# Patient Record
Sex: FEMALE | Race: WHITE | NOT HISPANIC OR LATINO
[De-identification: names, ages, dates, MRNs, and addresses within clinical notes are randomized per-mention and may not be internally consistent; named-entity substitution may affect disease eponyms.]

---

## 2018-11-08 PROBLEM — Z00.00 ENCOUNTER FOR PREVENTIVE HEALTH EXAMINATION: Status: ACTIVE | Noted: 2018-11-08

## 2018-11-16 ENCOUNTER — RECORD ABSTRACTING (OUTPATIENT)
Age: 83
End: 2018-11-16

## 2018-11-16 DIAGNOSIS — R55 SYNCOPE AND COLLAPSE: ICD-10-CM

## 2018-11-16 DIAGNOSIS — Z82.49 FAMILY HISTORY OF ISCHEMIC HEART DISEASE AND OTHER DISEASES OF THE CIRCULATORY SYSTEM: ICD-10-CM

## 2018-11-16 DIAGNOSIS — E53.8 DEFICIENCY OF OTHER SPECIFIED B GROUP VITAMINS: ICD-10-CM

## 2018-11-16 DIAGNOSIS — Z82.3 FAMILY HISTORY OF STROKE: ICD-10-CM

## 2018-11-16 DIAGNOSIS — Z86.19 PERSONAL HISTORY OF OTHER INFECTIOUS AND PARASITIC DISEASES: ICD-10-CM

## 2018-12-12 ENCOUNTER — APPOINTMENT (OUTPATIENT)
Dept: GERIATRICS | Facility: CLINIC | Age: 83
End: 2018-12-12

## 2018-12-14 ENCOUNTER — APPOINTMENT (OUTPATIENT)
Dept: CARDIOLOGY | Facility: CLINIC | Age: 83
End: 2018-12-14
Payer: MEDICARE

## 2018-12-14 VITALS
HEART RATE: 62 BPM | DIASTOLIC BLOOD PRESSURE: 70 MMHG | HEIGHT: 72 IN | BODY MASS INDEX: 21.67 KG/M2 | WEIGHT: 160 LBS | SYSTOLIC BLOOD PRESSURE: 110 MMHG

## 2018-12-14 DIAGNOSIS — Z87.891 PERSONAL HISTORY OF NICOTINE DEPENDENCE: ICD-10-CM

## 2018-12-14 PROCEDURE — 99213 OFFICE O/P EST LOW 20 MIN: CPT

## 2018-12-14 NOTE — PHYSICAL EXAM
[General Appearance - Well Developed] : well developed [Normal Appearance] : normal appearance [Well Groomed] : well groomed [General Appearance - Well Nourished] : well nourished [No Deformities] : no deformities [General Appearance - In No Acute Distress] : no acute distress [Normal Conjunctiva] : the conjunctiva exhibited no abnormalities [Eyelids - No Xanthelasma] : the eyelids demonstrated no xanthelasmas [Normal Oral Mucosa] : normal oral mucosa [No Oral Pallor] : no oral pallor [No Oral Cyanosis] : no oral cyanosis [Normal Jugular Venous A Waves Present] : normal jugular venous A waves present [Normal Jugular Venous V Waves Present] : normal jugular venous V waves present [No Jugular Venous Ferrara A Waves] : no jugular venous ferrara A waves [Respiration, Rhythm And Depth] : normal respiratory rhythm and effort [Exaggerated Use Of Accessory Muscles For Inspiration] : no accessory muscle use [Auscultation Breath Sounds / Voice Sounds] : lungs were clear to auscultation bilaterally [Heart Rate And Rhythm] : heart rate and rhythm were normal [Heart Sounds] : normal S1 and S2 [Murmurs] : no murmurs present [Abdomen Soft] : soft [Abdomen Tenderness] : non-tender [Abdomen Mass (___ Cm)] : no abdominal mass palpated [Abnormal Walk] : normal gait [Gait - Sufficient For Exercise Testing] : the gait was sufficient for exercise testing [Nail Clubbing] : no clubbing of the fingernails [Cyanosis, Localized] : no localized cyanosis [Petechial Hemorrhages (___cm)] : no petechial hemorrhages [Skin Color & Pigmentation] : normal skin color and pigmentation [] : no rash [No Venous Stasis] : no venous stasis [Skin Lesions] : no skin lesions [No Skin Ulcers] : no skin ulcer [No Xanthoma] : no  xanthoma was observed [Oriented To Time, Place, And Person] : oriented to person, place, and time [Affect] : the affect was normal [Mood] : the mood was normal [No Anxiety] : not feeling anxious

## 2018-12-14 NOTE — HISTORY OF PRESENT ILLNESS
[FreeTextEntry1] : Patient presents with increased frequency and intensity of palpitations over the past year. Pt reports the sensation of their heart skipping a beat. Pt denies alleviating or aggravating factors. Pt reports drinking 2 cups of caffeine. Palpitations are worse when lying down and at rest. Pt denies symptoms of chest pain, syncope, claudication, orthopnea, PND.\par               Other significant history include having 2 TIAs. \par        Pt saw Dr. Schneider and medical therapy is with toprol was started - pt states she feels no further chest pain. a stress echo conducted revealed no obvious ischemia. No cardiac cath or EP at this time. \par        Since last visit -blood pressure is well controlled.

## 2018-12-14 NOTE — ASSESSMENT
[FreeTextEntry1] : EKG - 12/4 - NSR, LAE, low voltage\par \par 1. Palpitations - improved on toprol\par 2. Essential (primary) hypertension - I10  controlled\par 3. NSVT (nonsustained ventricular tachycardia) - I47.2  - stress test in Nov 2017 - no ischemia. \par 4. Claudication - I73.9   improved\par  continue metoprolol, hydrochlorothiazide, amlodipine. \par Holter monitor in November 2017 reveals 13 beats of V. tach patient remains on Toprol no current issues\par Blood pressure is well controlled. 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 May repeat echocardiogram \par \par of note pt had a peripheral vascular work up for cynaotic toes but shows normal resting ABIs in oct 2017 \par However has abnormal exercise right lower extremity VELASQUEZ consistent with inducible ischemia. \par Follow-up with Dr. Sanchez. \par Pt does not know her meds - therefore unable to reconcile

## 2018-12-18 ENCOUNTER — APPOINTMENT (OUTPATIENT)
Dept: GERIATRICS | Facility: CLINIC | Age: 83
End: 2018-12-18
Payer: MEDICARE

## 2018-12-18 VITALS
RESPIRATION RATE: 20 BRPM | DIASTOLIC BLOOD PRESSURE: 68 MMHG | OXYGEN SATURATION: 98 % | HEART RATE: 76 BPM | TEMPERATURE: 96.9 F | SYSTOLIC BLOOD PRESSURE: 118 MMHG

## 2018-12-18 PROCEDURE — 99213 OFFICE O/P EST LOW 20 MIN: CPT

## 2018-12-31 ENCOUNTER — RX RENEWAL (OUTPATIENT)
Age: 83
End: 2018-12-31

## 2019-01-02 ENCOUNTER — APPOINTMENT (OUTPATIENT)
Dept: GERIATRICS | Facility: CLINIC | Age: 84
End: 2019-01-02
Payer: MEDICARE

## 2019-01-02 VITALS — HEART RATE: 70 BPM | DIASTOLIC BLOOD PRESSURE: 70 MMHG | SYSTOLIC BLOOD PRESSURE: 120 MMHG | RESPIRATION RATE: 16 BRPM

## 2019-01-02 PROCEDURE — 99214 OFFICE O/P EST MOD 30 MIN: CPT

## 2019-01-16 ENCOUNTER — APPOINTMENT (OUTPATIENT)
Dept: GERIATRICS | Facility: CLINIC | Age: 84
End: 2019-01-16
Payer: MEDICARE

## 2019-01-16 DIAGNOSIS — Z60.2 PROBLEMS RELATED TO LIVING ALONE: ICD-10-CM

## 2019-01-16 PROCEDURE — 99214 OFFICE O/P EST MOD 30 MIN: CPT

## 2019-01-16 SDOH — SOCIAL STABILITY - SOCIAL INSECURITY: PROBLEMS RELATED TO LIVING ALONE: Z60.2

## 2019-01-30 ENCOUNTER — APPOINTMENT (OUTPATIENT)
Dept: GERIATRICS | Facility: CLINIC | Age: 84
End: 2019-01-30
Payer: MEDICARE

## 2019-01-30 VITALS
DIASTOLIC BLOOD PRESSURE: 78 MMHG | RESPIRATION RATE: 16 BRPM | HEART RATE: 68 BPM | SYSTOLIC BLOOD PRESSURE: 130 MMHG | OXYGEN SATURATION: 98 %

## 2019-01-30 VITALS — SYSTOLIC BLOOD PRESSURE: 120 MMHG | RESPIRATION RATE: 16 BRPM | HEART RATE: 70 BPM | DIASTOLIC BLOOD PRESSURE: 80 MMHG

## 2019-01-30 PROBLEM — Z60.2 LIVING ALONE: Status: ACTIVE | Noted: 2019-01-30

## 2019-01-30 PROCEDURE — 99213 OFFICE O/P EST LOW 20 MIN: CPT

## 2019-01-30 RX ORDER — OXYBUTYNIN CHLORIDE 5 MG/1
5 TABLET, EXTENDED RELEASE ORAL
Qty: 30 | Refills: 0 | Status: DISCONTINUED | COMMUNITY
Start: 2018-10-18

## 2019-01-30 RX ORDER — CEPHALEXIN 500 MG/1
500 CAPSULE ORAL
Qty: 14 | Refills: 0 | Status: DISCONTINUED | COMMUNITY
Start: 2018-08-02

## 2019-01-30 RX ORDER — NITROFURANTOIN (MONOHYDRATE/MACROCRYSTALS) 25; 75 MG/1; MG/1
100 CAPSULE ORAL
Qty: 14 | Refills: 0 | Status: DISCONTINUED | COMMUNITY
Start: 2018-11-21

## 2019-01-30 RX ORDER — SULFAMETHOXAZOLE AND TRIMETHOPRIM 800; 160 MG/1; MG/1
800-160 TABLET ORAL
Qty: 6 | Refills: 0 | Status: DISCONTINUED | COMMUNITY
Start: 2018-07-30

## 2019-01-30 RX ORDER — MELOXICAM 7.5 MG/1
7.5 TABLET ORAL
Qty: 60 | Refills: 0 | Status: DISCONTINUED | COMMUNITY
Start: 2019-01-09 | End: 1900-01-01

## 2019-01-30 NOTE — HISTORY OF PRESENT ILLNESS
[1] : 2) Feeling down, depressed, or hopeless for several days [PHQ-2 Score ___] : PHQ-2 Score [unfilled] [FreeTextEntry1] : now on medication management\par no issues but still resistant to loss of independence\par there have been too many medication errors in past year\par to discuss antidepressant per daughters request\par

## 2019-01-30 NOTE — PHYSICAL EXAM
[General Appearance - Alert] : alert [General Appearance - In No Acute Distress] : in no acute distress [Sclera] : the sclera and conjunctiva were normal [PERRL With Normal Accommodation] : pupils were equal in size, round, and reactive to light [Extraocular Movements] : extraocular movements were intact [] : no respiratory distress [Auscultation Breath Sounds / Voice Sounds] : lungs were clear to auscultation bilaterally [Heart Rate And Rhythm] : heart rate was normal and rhythm regular [Heart Sounds] : normal S1 and S2 [Heart Sounds Gallop] : no gallops [Murmurs] : no murmurs [Heart Sounds Pericardial Friction Rub] : no pericardial rub [Motor Tone] : muscle strength and tone were normal [FreeTextEntry1] : No ecchymosis, no redness, no open skin of R knee or elsewhere

## 2019-01-30 NOTE — ASSESSMENT
[FreeTextEntry1] : No evidence of fracture, pt is walking well. Can use ice for next 24 hours then switch to warm heat for pain. Tylenol bid today and tomorrow and then prn. Pt verbalized understanding

## 2019-01-30 NOTE — HISTORY OF PRESENT ILLNESS
[FreeTextEntry1] : Pt had a trip and fall on 1/28 in the evening, did not lose conciousness, no dizziness, no chest pain

## 2019-01-30 NOTE — SOCIAL HISTORY
[One fall no injury in past year] : Patient reported one fall in the past year without injury [Fully functional (bathing, dressing, toileting, transferring, walking, feeding)] : Fully functional (bathing, dressing, toileting, transferring, walking, feeding)

## 2019-01-30 NOTE — REVIEW OF SYSTEMS
[Feeling Poorly] : feeling poorly [Feeling Tired] : feeling tired [Incontinence] : incontinence [Depression] : depression [Fever] : no fever [Chills] : no chills [Eye Pain] : no eye pain [Red Eyes] : eyes not red [Sore Throat] : no sore throat [Hoarseness] : no hoarseness [Chest Pain] : no chest pain [Palpitations] : no palpitations [Wheezing] : no wheezing [Cough] : no cough [SOB on Exertion] : no shortness of breath during exertion [Abdominal Pain] : no abdominal pain [Vomiting] : no vomiting [Vaginal Discharge] : no vaginal discharge [Abn Vaginal Bleeding] : no unexplained vaginal bleeding [Arthralgias] : no arthralgias [Joint Pain] : no joint pain [Skin Lesions] : no skin lesions [Skin Wound] : no skin wound

## 2019-01-30 NOTE — ASSESSMENT
[FreeTextEntry1] : discussed with patient that she truly thinks that if OAB could be improved her quality of life would drastically change\par will trial myrbetriq\par if no improvement in 2-4 weeks will dc entirely and reconsider antidepressant\par alerted medication managemnt nurse of changes

## 2019-01-30 NOTE — PHYSICAL EXAM
[General Appearance - Alert] : alert [General Appearance - In No Acute Distress] : in no acute distress [General Appearance - Well Nourished] : well nourished [General Appearance - Well Developed] : well developed [Sclera] : the sclera and conjunctiva were normal [PERRL With Normal Accommodation] : pupils were equal in size, round, and reactive to light [Extraocular Movements] : extraocular movements were intact [Normal Oral Mucosa] : normal oral mucosa [No Oral Pallor] : no oral pallor [Neck Appearance] : the appearance of the neck was normal [Respiration, Rhythm And Depth] : normal respiratory rhythm and effort [Exaggerated Use Of Accessory Muscles For Inspiration] : no accessory muscle use [Auscultation Breath Sounds / Voice Sounds] : lungs were clear to auscultation bilaterally [Heart Rate And Rhythm] : heart rate was normal and rhythm regular [Heart Sounds Gallop] : no gallops [Heart Sounds Pericardial Friction Rub] : no pericardial rub [Bowel Sounds] : normal bowel sounds [Abdomen Soft] : soft [Abdomen Tenderness] : non-tender [Skin Color & Pigmentation] : normal skin color and pigmentation [] : no rash [Affect] : the affect was normal [Mood] : the mood was normal [FreeTextEntry1] : memory loss more apparent and worse during UTI

## 2019-02-06 ENCOUNTER — APPOINTMENT (OUTPATIENT)
Dept: GERIATRICS | Facility: CLINIC | Age: 84
End: 2019-02-06
Payer: MEDICARE

## 2019-02-06 VITALS
TEMPERATURE: 98.7 F | OXYGEN SATURATION: 98 % | HEIGHT: 72 IN | HEART RATE: 99 BPM | BODY MASS INDEX: 21.54 KG/M2 | WEIGHT: 159 LBS | DIASTOLIC BLOOD PRESSURE: 80 MMHG | SYSTOLIC BLOOD PRESSURE: 122 MMHG

## 2019-02-06 DIAGNOSIS — Z60.2 PROBLEMS RELATED TO LIVING ALONE: ICD-10-CM

## 2019-02-06 PROCEDURE — 99214 OFFICE O/P EST MOD 30 MIN: CPT

## 2019-02-06 RX ORDER — LEVOFLOXACIN 500 MG/1
500 TABLET, FILM COATED ORAL DAILY
Qty: 7 | Refills: 0 | Status: DISCONTINUED | COMMUNITY
Start: 2018-12-21 | End: 2019-02-06

## 2019-02-06 SDOH — SOCIAL STABILITY - SOCIAL INSECURITY: PROBLEMS RELATED TO LIVING ALONE: Z60.2

## 2019-02-06 NOTE — HISTORY OF PRESENT ILLNESS
[0] : 1) Little interest or pleasure doing things: Not at all [1] : 2) Feeling down, depressed, or hopeless for several days [PHQ-2 Score ___] : PHQ-2 Score [unfilled] [FreeTextEntry1] : presenting with daughter \par trialed myrbetriq nighttime urination imrpoved from 3 to 2x per night\par no substantial change per patient\par

## 2019-02-06 NOTE — ASSESSMENT
[FreeTextEntry1] : will trial higher dose myrbetriq 50mg daily x 7 days\par if no improvement in quality of life would stop entirely and consider fluoxetine low dose\par all in agreement\par advise 3 glasses of water daily\par likely leading to dizziness that predisposes to dehydrationa nd UTI

## 2019-02-06 NOTE — REVIEW OF SYSTEMS
[Incontinence] : incontinence [Arthralgias] : arthralgias [Joint Pain] : joint pain [Depression] : depression [Feeling Poorly] : not feeling poorly [Feeling Tired] : not feeling tired [Eye Pain] : no eye pain [Red Eyes] : eyes not red [Nosebleeds] : no nosebleeds [Nasal Discharge] : no nasal discharge [Chest Pain] : no chest pain [Palpitations] : no palpitations [Shortness Of Breath] : no shortness of breath [Wheezing] : no wheezing [Abdominal Pain] : no abdominal pain [Vomiting] : no vomiting [Vaginal Discharge] : no vaginal discharge [Abn Vaginal Bleeding] : no unexplained vaginal bleeding [Skin Lesions] : no skin lesions [Skin Wound] : no skin wound

## 2019-02-06 NOTE — SOCIAL HISTORY
[Any fall with injury in past year] : Patient reported fall with injury in the past year [Fully functional (bathing, dressing, toileting, transferring, walking, feeding)] : Fully functional (bathing, dressing, toileting, transferring, walking, feeding) [Fully functional (using the telephone, shopping, preparing meals, housekeeping, doing laundry, using transportation,] : Fully functional and needs no help or supervision to perform IADLs (using the telephone, shopping, preparing meals, housekeeping, doing laundry, using transportation, managing medications and managing finances) [Canes] : chuy

## 2019-02-15 ENCOUNTER — APPOINTMENT (OUTPATIENT)
Dept: GERIATRICS | Facility: CLINIC | Age: 84
End: 2019-02-15
Payer: MEDICARE

## 2019-02-15 VITALS — HEART RATE: 91 BPM | SYSTOLIC BLOOD PRESSURE: 104 MMHG | DIASTOLIC BLOOD PRESSURE: 60 MMHG | OXYGEN SATURATION: 94 %

## 2019-02-15 PROCEDURE — 99214 OFFICE O/P EST MOD 30 MIN: CPT

## 2019-02-15 NOTE — ASSESSMENT
[FreeTextEntry1] : Pt recently was started and increased on Myrbetriq and may be getting dehydrated\par She agreed to increase fluids. Also Rest, supportive care.\par Son José will be with pt over weekend seek emergent care if any chest pain, SOB or pain\par d/w Dr. FUCHS in agreement. Pt will f/u with Dr. FUCHS on 4/20

## 2019-02-15 NOTE — HISTORY OF PRESENT ILLNESS
[FreeTextEntry1] : Has had a cough for about a week, worse at night\par feeling tired, no pain, no SOB, no chills, no fever, no body ache\par no dysuria

## 2019-02-15 NOTE — PHYSICAL EXAM
[Moist] : moist mucosa [Exudate] : exudate [] : no respiratory distress [Respiration, Rhythm And Depth] : normal respiratory rhythm and effort [FreeTextEntry1] : occassional scattered wheeze [Apical Impulse] : the apical impulse was normal [Heart Rate And Rhythm] : heart rate was normal and rhythm regular

## 2019-02-20 ENCOUNTER — APPOINTMENT (OUTPATIENT)
Dept: GERIATRICS | Facility: ASSISTED LIVING FACILITY | Age: 84
End: 2019-02-20
Payer: MEDICARE

## 2019-02-20 PROCEDURE — 99214 OFFICE O/P EST MOD 30 MIN: CPT

## 2019-02-22 VITALS — DIASTOLIC BLOOD PRESSURE: 70 MMHG | RESPIRATION RATE: 16 BRPM | HEART RATE: 86 BPM | SYSTOLIC BLOOD PRESSURE: 110 MMHG

## 2019-02-22 NOTE — ASSESSMENT
[FreeTextEntry1] : to complete antibiotoics for URI\par will hold HCTZ 25mg and amitryptaline x 4 weeks\par if worsening LE edema/HTN or bladder/depression will resume\par next would be to stop vitamin B12 and possibly oxybutynin\par continue mobic\par rewrote PT Rx as requested by PT department - PRAVIN BL knees\par continue myrbetriq at 50mg could consider dc oxybutynin in future\par try to avoid polypharmacy\par RTC 2 weeks to have BP checked and reevalute meds\par also to discuss antidepressant afte rthese changes are all done

## 2019-02-22 NOTE — HISTORY OF PRESENT ILLNESS
[1] : 2) Feeling down, depressed, or hopeless for several days [PHQ-2 Score ___] : PHQ-2 Score [unfilled] [FreeTextEntry1] : remains on myrbetriq\par recently on antibiotics by NP for suspected bacterial URI\par feels she is on too many medications\par

## 2019-02-22 NOTE — REVIEW OF SYSTEMS
[Feeling Poorly] : feeling poorly [Feeling Tired] : feeling tired [Cough] : cough [Incontinence] : incontinence [Arthralgias] : arthralgias [Joint Pain] : joint pain [Fever] : no fever [Chills] : no chills [Eye Pain] : no eye pain [Red Eyes] : eyes not red [Nosebleeds] : no nosebleeds [Nasal Discharge] : no nasal discharge [Sore Throat] : no sore throat [Hoarseness] : no hoarseness [Heart Rate Is Slow] : the heart rate was not slow [Heart Rate Is Fast] : the heart rate was not fast [Chest Pain] : no chest pain [Palpitations] : no palpitations [Shortness Of Breath] : no shortness of breath [Wheezing] : no wheezing [Abdominal Pain] : no abdominal pain [Vomiting] : no vomiting [Vaginal Discharge] : no vaginal discharge [Abn Vaginal Bleeding] : no unexplained vaginal bleeding [Skin Lesions] : no skin lesions [Skin Wound] : no skin wound [Dizziness] : no dizziness [Fainting] : no fainting [Change In Personality] : no personality change [Emotional Problems] : no emotional problems

## 2019-03-06 ENCOUNTER — APPOINTMENT (OUTPATIENT)
Dept: GERIATRICS | Facility: CLINIC | Age: 84
End: 2019-03-06

## 2019-03-19 ENCOUNTER — APPOINTMENT (OUTPATIENT)
Dept: CARDIOLOGY | Facility: CLINIC | Age: 84
End: 2019-03-19
Payer: MEDICARE

## 2019-03-19 VITALS
WEIGHT: 155 LBS | DIASTOLIC BLOOD PRESSURE: 80 MMHG | HEIGHT: 70 IN | BODY MASS INDEX: 22.19 KG/M2 | SYSTOLIC BLOOD PRESSURE: 130 MMHG | HEART RATE: 84 BPM

## 2019-03-19 DIAGNOSIS — I35.9 NONRHEUMATIC AORTIC VALVE DISORDER, UNSPECIFIED: ICD-10-CM

## 2019-03-19 PROCEDURE — 99214 OFFICE O/P EST MOD 30 MIN: CPT

## 2019-03-19 NOTE — HISTORY OF PRESENT ILLNESS
[FreeTextEntry1] : Patient originally presented with increased frequency and intensity of palpitations over the past year described as a sensation of her heart skipping a beat. Pt denies alleviating or aggravating factors. She reports drinking 2 cups of caffeine. Palpitations are worse when lying down and at rest. Pt denies symptoms of chest pain, syncope, claudication, orthopnea, PND.\par               Other significant history include having 2 TIAs. \par        Pt saw Dr. Schneider and medical therapy with toprol was started - pt states she feels no further chest pain. a stress echo conducted revealed no obvious ischemia. \par        Since last visit -blood pressure is well controlled. Pt does not recall her medications but states they were reconciled with her PCP.

## 2019-03-19 NOTE — PHYSICAL EXAM
[General Appearance - Well Developed] : well developed [Normal Appearance] : normal appearance [Well Groomed] : well groomed [General Appearance - Well Nourished] : well nourished [No Deformities] : no deformities [General Appearance - In No Acute Distress] : no acute distress [Normal Conjunctiva] : the conjunctiva exhibited no abnormalities [Eyelids - No Xanthelasma] : the eyelids demonstrated no xanthelasmas [Normal Oral Mucosa] : normal oral mucosa [No Oral Pallor] : no oral pallor [No Oral Cyanosis] : no oral cyanosis [Normal Jugular Venous A Waves Present] : normal jugular venous A waves present [Normal Jugular Venous V Waves Present] : normal jugular venous V waves present [No Jugular Venous Ferrara A Waves] : no jugular venous ferrara A waves [Respiration, Rhythm And Depth] : normal respiratory rhythm and effort [Exaggerated Use Of Accessory Muscles For Inspiration] : no accessory muscle use [Auscultation Breath Sounds / Voice Sounds] : lungs were clear to auscultation bilaterally [Heart Rate And Rhythm] : heart rate and rhythm were normal [Heart Sounds] : normal S1 and S2 [Murmurs] : no murmurs present [Abdomen Tenderness] : non-tender [Abdomen Soft] : soft [Abdomen Mass (___ Cm)] : no abdominal mass palpated [Abnormal Walk] : normal gait [Gait - Sufficient For Exercise Testing] : the gait was sufficient for exercise testing [Nail Clubbing] : no clubbing of the fingernails [Cyanosis, Localized] : no localized cyanosis [Petechial Hemorrhages (___cm)] : no petechial hemorrhages [] : no rash [Skin Color & Pigmentation] : normal skin color and pigmentation [No Venous Stasis] : no venous stasis [Skin Lesions] : no skin lesions [No Xanthoma] : no  xanthoma was observed [No Skin Ulcers] : no skin ulcer [Oriented To Time, Place, And Person] : oriented to person, place, and time [Affect] : the affect was normal [No Anxiety] : not feeling anxious [Mood] : the mood was normal

## 2019-03-20 ENCOUNTER — APPOINTMENT (OUTPATIENT)
Dept: GERIATRICS | Facility: CLINIC | Age: 84
End: 2019-03-20

## 2019-03-21 ENCOUNTER — APPOINTMENT (OUTPATIENT)
Dept: GERIATRICS | Facility: CLINIC | Age: 84
End: 2019-03-21

## 2019-04-12 ENCOUNTER — APPOINTMENT (OUTPATIENT)
Dept: GERIATRICS | Facility: CLINIC | Age: 84
End: 2019-04-12
Payer: MEDICARE

## 2019-04-12 VITALS — SYSTOLIC BLOOD PRESSURE: 131 MMHG | HEART RATE: 70 BPM | RESPIRATION RATE: 16 BRPM | DIASTOLIC BLOOD PRESSURE: 80 MMHG

## 2019-04-12 DIAGNOSIS — Z87.09 PERSONAL HISTORY OF OTHER DISEASES OF THE RESPIRATORY SYSTEM: ICD-10-CM

## 2019-04-12 PROCEDURE — 99213 OFFICE O/P EST LOW 20 MIN: CPT

## 2019-04-12 NOTE — REVIEW OF SYSTEMS
[Eye Pain] : no eye pain [Red Eyes] : red eyes [Eyesight Problems] : no eyesight problems [Discharge From Eyes] : no purulent discharge from the eyes [Eyes Itch] : no itching of the eyes [Negative] : Constitutional

## 2019-04-12 NOTE — ASSESSMENT
[FreeTextEntry1] : No treatment for eye at this time, blood will resorb. However, pt should see ophthalmologist for a routine annual check. As per pt, she has not seen one in several years, Educated pt to seek immediate ophthalmological assistance / ER if any visual disturbances; she verbalized understanding. \par Continue PT, no sign of deformity. However pt should f/u with orthopedist to ensure no further treatment necessary. Provided her with Dr. Cobb's name and number and she will f/u.

## 2019-04-12 NOTE — PHYSICAL EXAM
[General Appearance - Alert] : alert [Subconjunctival Hemorrhage Right Eye] : subconjunctival hemorrhage [General Appearance - In No Acute Distress] : in no acute distress [Heart Rate And Rhythm] : heart rate was normal and rhythm regular [FreeTextEntry1] : OD sclera with small amount of blood to the medial aspect, OU do appear to bulge slightly, although this is chronic

## 2019-04-12 NOTE — HISTORY OF PRESENT ILLNESS
[FreeTextEntry1] : Noticed redness in the corner of her right eye\par since this morning, no visual disturbances, no headache\par also has a complaint about chronic weakness in her\par knees, which buckle occasionally. She has had several falls in the past.\par Is currently in PT \par does not have pain

## 2019-06-03 ENCOUNTER — MEDICATION RENEWAL (OUTPATIENT)
Age: 84
End: 2019-06-03

## 2019-06-12 ENCOUNTER — APPOINTMENT (OUTPATIENT)
Dept: GERIATRICS | Facility: CLINIC | Age: 84
End: 2019-06-12
Payer: MEDICARE

## 2019-06-12 VITALS
SYSTOLIC BLOOD PRESSURE: 140 MMHG | RESPIRATION RATE: 18 BRPM | TEMPERATURE: 96.9 F | DIASTOLIC BLOOD PRESSURE: 78 MMHG | HEART RATE: 72 BPM
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
## 2019-06-12 PROCEDURE — 99213 OFFICE O/P EST LOW 20 MIN: CPT
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
## 2019-06-14 LAB — COLOR: YELLOW

## 2019-06-14 RX ORDER — HYDROCHLOROTHIAZIDE 25 MG/1
25 TABLET ORAL DAILY
Qty: 30 | Refills: 1 | Status: DISCONTINUED | COMMUNITY
Start: 2018-12-31 | End: 2019-06-14

## 2019-06-14 RX ORDER — AMITRIPTYLINE HYDROCHLORIDE 10 MG/1
10 TABLET, FILM COATED ORAL
Refills: 0 | Status: DISCONTINUED | COMMUNITY
End: 2019-06-14

## 2019-06-14 RX ORDER — GUAIFENESIN 600 MG/1
600 TABLET, EXTENDED RELEASE ORAL
Qty: 14 | Refills: 0 | Status: DISCONTINUED | COMMUNITY
Start: 2019-02-15 | End: 2019-06-14

## 2019-06-14 RX ORDER — AZITHROMYCIN 250 MG/1
250 TABLET, FILM COATED ORAL
Qty: 1 | Refills: 0 | Status: DISCONTINUED | COMMUNITY
Start: 2019-02-15 | End: 2019-06-14

## 2019-06-14 NOTE — PHYSICAL EXAM
[General Appearance - Alert] : alert [General Appearance - In No Acute Distress] : in no acute distress [Respiration, Rhythm And Depth] : normal respiratory rhythm and effort [] : no respiratory distress [Bowel Sounds] : normal bowel sounds [Heart Rate And Rhythm] : heart rate was normal and rhythm regular [Abdomen Soft] : soft

## 2019-06-14 NOTE — REVIEW OF SYSTEMS
[Feeling Tired] : feeling tired [Dysuria] : dysuria [Incontinence] : incontinence [Negative] : Respiratory [Fever] : no fever [Chills] : no chills [FreeTextEntry2] : no cough or cold symptoms [FreeTextEntry8] : urinary frequency

## 2019-06-17 ENCOUNTER — RX CHANGE (OUTPATIENT)
Age: 84
End: 2019-06-17

## 2019-08-12 ENCOUNTER — RX RENEWAL (OUTPATIENT)
Age: 84
End: 2019-08-12

## 2019-08-15 ENCOUNTER — RX RENEWAL (OUTPATIENT)
Age: 84
End: 2019-08-15

## 2019-09-09 ENCOUNTER — MEDICATION RENEWAL (OUTPATIENT)
Age: 84
End: 2019-09-09

## 2019-09-09 ENCOUNTER — RX RENEWAL (OUTPATIENT)
Age: 84
End: 2019-09-09

## 2019-09-10 ENCOUNTER — RX RENEWAL (OUTPATIENT)
Age: 84
End: 2019-09-10

## 2019-09-10 ENCOUNTER — APPOINTMENT (OUTPATIENT)
Dept: CARDIOLOGY | Facility: CLINIC | Age: 84
End: 2019-09-10

## 2019-09-11 ENCOUNTER — APPOINTMENT (OUTPATIENT)
Dept: GERIATRICS | Facility: CLINIC | Age: 84
End: 2019-09-11
Payer: MEDICARE

## 2019-09-11 ENCOUNTER — APPOINTMENT (OUTPATIENT)
Dept: GERIATRICS | Facility: CLINIC | Age: 84
End: 2019-09-11

## 2019-09-11 DIAGNOSIS — E78.2 MIXED HYPERLIPIDEMIA: ICD-10-CM

## 2019-09-11 PROCEDURE — 99215 OFFICE O/P EST HI 40 MIN: CPT

## 2019-09-12 ENCOUNTER — RESULT REVIEW (OUTPATIENT)
Age: 84
End: 2019-09-12

## 2019-09-13 VITALS — SYSTOLIC BLOOD PRESSURE: 120 MMHG | HEART RATE: 70 BPM | DIASTOLIC BLOOD PRESSURE: 80 MMHG | RESPIRATION RATE: 18 BRPM

## 2019-09-13 PROBLEM — E78.2 MIXED HYPERLIPIDEMIA: Status: ACTIVE | Noted: 2019-08-15

## 2019-09-13 RX ORDER — OXYBUTYNIN CHLORIDE 5 MG/1
5 TABLET ORAL
Qty: 30 | Refills: 5 | Status: DISCONTINUED | COMMUNITY
End: 2019-09-13

## 2019-09-13 RX ORDER — MIRABEGRON 50 MG/1
50 TABLET, FILM COATED, EXTENDED RELEASE ORAL
Qty: 90 | Refills: 0 | Status: DISCONTINUED | COMMUNITY
Start: 2019-02-06 | End: 2019-09-13

## 2019-09-13 RX ORDER — ATORVASTATIN CALCIUM 80 MG/1
80 TABLET, FILM COATED ORAL
Qty: 90 | Refills: 0 | Status: DISCONTINUED | COMMUNITY
Start: 2019-08-15 | End: 2019-09-13

## 2019-09-13 RX ORDER — METOPROLOL SUCCINATE 25 MG/1
25 TABLET, EXTENDED RELEASE ORAL
Refills: 0 | Status: DISCONTINUED | COMMUNITY
End: 2019-09-13

## 2019-09-13 NOTE — PHYSICAL EXAM
[General Appearance - Alert] : alert [General Appearance - In No Acute Distress] : in no acute distress [General Appearance - Well Nourished] : well nourished [General Appearance - Well Developed] : well developed [Sclera] : the sclera and conjunctiva were normal [PERRL With Normal Accommodation] : pupils were equal in size, round, and reactive to light [Extraocular Movements] : extraocular movements were intact [Normal Oral Mucosa] : normal oral mucosa [Neck Appearance] : the appearance of the neck was normal [No Oral Pallor] : no oral pallor [Exaggerated Use Of Accessory Muscles For Inspiration] : no accessory muscle use [Respiration, Rhythm And Depth] : normal respiratory rhythm and effort [Auscultation Breath Sounds / Voice Sounds] : lungs were clear to auscultation bilaterally [Heart Rate And Rhythm] : heart rate was normal and rhythm regular [Heart Sounds Gallop] : no gallops [Heart Sounds Pericardial Friction Rub] : no pericardial rub [Bowel Sounds] : normal bowel sounds [Abdomen Soft] : soft [Abdomen Tenderness] : non-tender [Skin Color & Pigmentation] : normal skin color and pigmentation [Affect] : the affect was normal [] : no rash [Mood] : the mood was normal [FreeTextEntry1] : memory loss more apparent and worse during UTI

## 2019-09-13 NOTE — REVIEW OF SYSTEMS
[Feeling Poorly] : feeling poorly [Feeling Tired] : feeling tired [Cough] : cough [Incontinence] : incontinence [Arthralgias] : arthralgias [Joint Pain] : joint pain [Fever] : no fever [Chills] : no chills [Eye Pain] : no eye pain [Red Eyes] : eyes not red [Nosebleeds] : no nosebleeds [Nasal Discharge] : no nasal discharge [Sore Throat] : no sore throat [Heart Rate Is Slow] : the heart rate was not slow [Hoarseness] : no hoarseness [Heart Rate Is Fast] : the heart rate was not fast [Palpitations] : no palpitations [Chest Pain] : no chest pain [Shortness Of Breath] : no shortness of breath [Wheezing] : no wheezing [Vomiting] : no vomiting [Abdominal Pain] : no abdominal pain [Abn Vaginal Bleeding] : no unexplained vaginal bleeding [Vaginal Discharge] : no vaginal discharge [Skin Lesions] : no skin lesions [Skin Wound] : no skin wound [Dizziness] : no dizziness [Fainting] : no fainting [Change In Personality] : no personality change [Emotional Problems] : no emotional problems

## 2019-09-13 NOTE — ASSESSMENT
[FreeTextEntry1] : Marked memory loss on my visit today\par I have advised patient and son that I do not think she should move forward with Mohs surgery this week\par She is currently on steroids and flexeril x 3 days from ER for severe back pain that is at this time well controlled\par I suspect she has had a neurologic event - unclear time line but there has been a substantial stepwise decline since our list visit\par I have called [facility] to sschedule MRI for tomorrow to rule out infarct\par \par med list reviewed \par oxybutynin stopped\par all other meds to continue for now\par prior goal had been quality of life\par now memory may be more worrisome\par \par consider pain management if no improvement in pain\par spoke with son about getting aide services to help with her with ADLs etc\par \par \par next would be to stop vitamin B12 and possibly oxybutynin\par continue mobic\par rewrote PT Rx as requested by PT department - DJD BL knees\par continue myrbetriq at 50mg could consider dc oxybutynin in future\par try to avoid polypharmacy\par RTC 2 weeks to have BP checked and reevalute meds\par also to discuss antidepressant afte rthese changes are all done

## 2019-09-13 NOTE — ADDENDUM
[FreeTextEntry1] : I spoke with son after MRI reading complete\par consistent with vascular changes - nothing acute\par this may have happened over the summer which is what I suspect given her acute decline\par continue off of oxybutyninmay also stop that and consider frequent toileting/diapers\par pain controlled at this time\par pain managemnet request from son provided pain center number \par will meet next wed to discuss results with patient\par and to diucss long term issues - safety, HHA for ADLs, also to confirm BP etc\par

## 2019-09-13 NOTE — HISTORY OF PRESENT ILLNESS
[Moderate] : Stage: Moderate [Worse] : Status: Worse [Memory Lapses Or Loss] : worsened memory impairment [1] : 2) Feeling down, depressed, or hopeless for several days [PHQ-2 Score ___] : PHQ-2 Score [unfilled] [FreeTextEntry1] : Patient had been vacationing in [place]\par Son had been present with her\par Apparently family had been allowing her to do her own pill pour/medications\par She had several visits to a medical clinic for various reasons, see consult note\par She apparently fell into a ditch and was noted to have compression fractures\par Her medications were changed ie advised to stop oxybutynin.\par On return to [place] she forgot several of her medications.\par Back pain was severe on the first night back in [place] and she went to [place] ED.  Imaging confirmed compression fracture and she was given Medrol pack and Flexeril.\par Son José at visit today \par notes asaf memory drop over summer\par patient is not able to recall events from earlier in theday\par certainly unable to manage medications (on medication managemnet at [facility])\par pain is better controlled at this time but more confusion is noted\par

## 2019-09-18 ENCOUNTER — RESULT REVIEW (OUTPATIENT)
Age: 84
End: 2019-09-18

## 2019-09-18 ENCOUNTER — APPOINTMENT (OUTPATIENT)
Dept: PAIN MANAGEMENT | Facility: CLINIC | Age: 84
End: 2019-09-18
Payer: MEDICARE

## 2019-09-18 ENCOUNTER — APPOINTMENT (OUTPATIENT)
Dept: GERIATRICS | Facility: CLINIC | Age: 84
End: 2019-09-18
Payer: MEDICARE

## 2019-09-18 VITALS
WEIGHT: 145 LBS | DIASTOLIC BLOOD PRESSURE: 85 MMHG | SYSTOLIC BLOOD PRESSURE: 140 MMHG | BODY MASS INDEX: 22.76 KG/M2 | HEIGHT: 67 IN

## 2019-09-18 PROCEDURE — 99204 OFFICE O/P NEW MOD 45 MIN: CPT

## 2019-09-18 PROCEDURE — 99215 OFFICE O/P EST HI 40 MIN: CPT

## 2019-09-18 NOTE — REVIEW OF SYSTEMS
[Feeling Tired] : fatigue [Urinary Urgency] : urinary urgency [Urinary Frequency] : urinary frequency [Negative] : Heme/Lymph [de-identified] : weakness,memory loss

## 2019-09-18 NOTE — PHYSICAL EXAM
[Normal muscle bulk without asymmetry] : normal muscle bulk without asymmetry [Spine: Flexion to ___ degrees, without pain] : spine: flexion to [unfilled] degrees, without pain [SI Provacative Testing] : positive SI Provacative Testing [de-identified] : Constitutional: Normal, well developed, no acute distress\par Eyes: Symmetric, External structures \par Oropharynx: Lips normal, symmetric, no external lesions appreciated\par Respiratory: Non-labored breathing, no audible wheezes\par Cardiac: Pulse palpated, no tachycardia\par Vascular: No cyanosis appreciated, no edema in bilateral lower extremities\par GI: Nondistended, no jaundice appreciated\par Neurovascular: CN2-12 grossly intact, Alert and oriented\par MSK: Normal muscle bulk, 5/5 Motor strength B/L in LE\par \par  [de-identified] : mild pain on palpation of upper lumbar spine

## 2019-09-18 NOTE — REASON FOR VISIT
[Initial Consultation] : an initial pain management consultation [FreeTextEntry2] : referred by Dr. Reyes  for evaluation of back pain

## 2019-09-18 NOTE — HISTORY OF PRESENT ILLNESS
[Back Pain] : back pain [___ mths] : [unfilled] month(s) ago [9] : a maximum pain level of 9/10 [Heat] : heat [Aching] : aching [8] : 1. What number best describes your pain on average in the past week? 8/10 pain [7] : 3. What number best describes how, during the past week, pain has interfered with your general activity? 7/10 pain [FreeTextEntry1] : HPI\par \par Ms. JUMA KEY is a 89 year F with pmhx of TIA on asa 81mg, hx of colon ca s/p resection, htn, lumbar discectomy and left hip replacement.  Presents with bilateral lower back pain for 1 month after possible falls.  \par \par \par Previous and current pain medications/doses/effects:\par \par Tylenol and Meloxicam with mild improvement\par \par Previous Pain Treatments:\par \par n/a\par Previous Pain Injections:\par \par n/a\par \par Previous Diagnostic Studies/Images:\par \par XR LS 9/2019\par \par There is mild to moderate dextroscoliosis of the lumbar spine. The lumbar lordosis is grossly   \par maintained. There is degenerative grade 1 anterolisthesis of L4 on L5. The bones are diffusely   \par osteopenic. Diffuse osteopenia and scoliosis somewhat limits evaluation of the lateral projection.   \par Within this limitation, a mild age indeterminate compression of the superior endplate of L1 is   \par questioned. No other compression fractures are suspected. There are overall moderate degenerative   \par changes of the lumbar spine including disc space narrowing, endplate osteophyte formation, and facet  \par arthropathy. A large left lateral endplate osteophyte is noted at L2-3. A right hip replacement is   \par noted. Vascular calcifications and a left abdominal surgical chain suture are noted in the soft   \par tissues.  \par  \par  \par  IMPRESSION:  \par  \par  Question mild age-indeterminate compression of the superior endplate of L1. Lumbar scoliosis and   \par spondylosis.  \par  \par  [FreeTextEntry3] : go up/down stairs [FreeTextEntry2] : 22

## 2019-09-18 NOTE — ASSESSMENT
[FreeTextEntry1] : back and leg pain likely secondary to lumbar discogenic pain  vs possible sacral fx, vs vcf refractory to conservative treatments, will obtain MRI LS to evaluate for pathology\par \par may consider PT vs intervention pending eval\par \par continue Tylenol\par \par \par The above diagnosis and treatment plan is medically reasonable and necessary based on the patient encounter.\par \par There were no barriers to communication.\par Informed patient that I would be available for any additional questions.\par Patient was instructed to call with any worsening symptoms including severe pain, new numbness/weakness, or changes in the bowel/bladder function. \par \par Discussed role of nsaids in pain management and all relevant risks, if patient is continuing to require after 4 weeks the patient should f/u for alternative treatment. \par \par Instructed patient to maintain pain diary to monitor pain level, mobility, and function.\par \par The referring provider was informed of the above diagnosis and treatment plan.\par

## 2019-09-20 ENCOUNTER — RX RENEWAL (OUTPATIENT)
Age: 84
End: 2019-09-20

## 2019-09-20 ENCOUNTER — APPOINTMENT (OUTPATIENT)
Dept: PAIN MANAGEMENT | Facility: CLINIC | Age: 84
End: 2019-09-20
Payer: MEDICARE

## 2019-09-20 VITALS
SYSTOLIC BLOOD PRESSURE: 142 MMHG | BODY MASS INDEX: 22.76 KG/M2 | HEIGHT: 67 IN | DIASTOLIC BLOOD PRESSURE: 84 MMHG | WEIGHT: 145 LBS

## 2019-09-20 VITALS — DIASTOLIC BLOOD PRESSURE: 80 MMHG | HEART RATE: 88 BPM | SYSTOLIC BLOOD PRESSURE: 130 MMHG | RESPIRATION RATE: 18 BRPM

## 2019-09-20 PROCEDURE — 99214 OFFICE O/P EST MOD 30 MIN: CPT

## 2019-09-20 RX ORDER — AMLODIPINE BESYLATE 2.5 MG/1
2.5 TABLET ORAL
Qty: 90 | Refills: 3 | Status: DISCONTINUED | COMMUNITY
Start: 2018-12-31 | End: 2019-09-20

## 2019-09-20 RX ORDER — MELOXICAM 7.5 MG/1
7.5 TABLET ORAL TWICE DAILY
Qty: 60 | Refills: 3 | Status: DISCONTINUED | COMMUNITY
Start: 2019-02-20 | End: 2019-09-20

## 2019-09-20 NOTE — PHYSICAL EXAM
[Normal muscle bulk without asymmetry] : normal muscle bulk without asymmetry [Spine: Flexion to ___ degrees, without pain] : spine: flexion to [unfilled] degrees, without pain [SI Provacative Testing] : positive SI Provacative Testing [Normal] : Well developed, in no acute distress, alert and oriented to person, place and time [de-identified] : \par \par  [de-identified] : mild pain on palpation of upper lumbar spine

## 2019-09-20 NOTE — ASSESSMENT
[FreeTextEntry1] : I personally reviewed the relevant imaging.  Discussed and explained to patient the likely source of pathology and pain.  Questions answered.\par \par SIgnificant pain maladaptive, after fall with + SIJ provocative tests and refractory to conservative treatments.  Will schedule LEFT SIJ steroid injection r/b/a discussed\par \par informed patient of risks of steroid administration including transient worsening of blood glucose, htn, mood changes, progressive osteoporosis.  Encouraged to call with questions and concerns.\par \par continue Tylenol\par \par L1 subacute compression fx on MRI, unlikely source of back pain.  May consider further PT/treatment\par \par referral to Dr. Wilson for endocrinology evaluation treatment of osteoporosis\par \par The above diagnosis and treatment plan is medically reasonable and necessary based on the patient encounter.\par \par There were no barriers to communication.\par Informed patient that I would be available for any additional questions.\par Patient was instructed to call with any worsening symptoms including severe pain, new numbness/weakness, or changes in the bowel/bladder function. \par \par Discussed role of nsaids in pain management and all relevant risks, if patient is continuing to require after 4 weeks the patient should f/u for alternative treatment. \par \par Instructed patient to maintain pain diary to monitor pain level, mobility, and function.\par \par The referring provider was informed of the above diagnosis and treatment plan.\par

## 2019-09-20 NOTE — HISTORY OF PRESENT ILLNESS
[Back Pain] : back pain [___ mths] : [unfilled] month(s) ago [Aching] : aching [9] : a maximum pain level of 9/10 [Heat] : heat [8] : 1. What number best describes your pain on average in the past week? 8/10 pain [7] : 3. What number best describes how, during the past week, pain has interfered with your general activity? 7/10 pain [FreeTextEntry1] : Interval Note:\par \par Since last visit the pain is not improved. Continues to have pain over left buttock region.  Pain is worse with ambulation. Denies any additional weakness, numbness, bowel/bladder dysfunction. \par \par \par HPI\par \par Ms. JUMA KEY is a 89 year F with pmhx of TIA on asa 81mg, hx of colon ca s/p resection, htn, lumbar discectomy and left hip replacement.  Presents with bilateral lower back pain for 1 month after possible falls.  \par \par \par Previous and current pain medications/doses/effects:\par \par Tylenol and Meloxicam with mild improvement\par \par Previous Pain Treatments:\par \par n/a\par Previous Pain Injections:\par \par n/a\par \par Previous Diagnostic Studies/Images:\par \par MRI LS 9/2019\par \par \par  There is an acute/subacute compression deformity of the L1 vertebral body resulting in   \par approximately 40% loss of vertebral body height. There is associated mild retropulsion of the   \par posterior superior endplate into the central canal resulting in mild central canal narrowing. No   \par significant epidural hematoma. There is a mild compression deformity of the L2 vertebral body   \par without significant loss of vertebral body height. There is minimal T2 signal hyperintensity within   \par the S1 and S2 vertebral bodies, suspicious for sequelae of nondisplaced sacral insufficiency   \par fractures.  \par  \par  There are 5 lumbar vertebral bodies.  The spinal cord termination is at the L1 level.  There is no   \par evidence of significant spondylolisthesis.  No areas of bone destruction or marrow replacement are   \par identified.    \par  \par  Evaluation of the individual levels is as follows:  \par  \par  T12/L1 level: Moderate bilateral facet arthropathy and posterior superior endplate retropulsion   \par results in mild central canal narrowing and mild bilateral neural foraminal narrowing.     \par  \par  L1/2 level: Moderate bilateral facet arthropathy and small broad-based posterior disc bulge with   \par superimposed small right intraforaminal disc protrusion result in mild bilateral neural foraminal   \par narrowing but no significant central canal narrowing.     \par  \par  L2/3 level: Moderate bilateral facet arthropathy and broad-based posterior disc bulge results in   \par mild-to-moderate right neural foraminal narrowing, severe left neural foraminal narrowing and   \par moderate central canal narrowing. There is impingement on the bilateral descending L3 nerve roots   \par within the right and left lateral recesses.     \par  \par  L3/4 level: Moderate bilateral facet arthropathy and small broad-based posterior disc bulge results  \par in mild to moderate right neural foraminal narrowing, moderate left neural foraminal narrowing and   \par mild to moderate central canal narrowing. There is impingement on the bilateral descending L4 nerve   \par roots within the right and left lateral recesses.     \par  \par  L4/5 level: Mild bilateral facet arthropathy and small broad-based posterior disc bulge with   \par superimposed small right intraforaminal disc protrusion result in mild-to-moderate right neural   \par foraminal narrowing, mild left neural foraminal narrowing but no significant central canal   \par narrowing.     \par  \par  L5/S1 level: Mild bilateral facet arthropathy and small broad-based posterior disc bulge results in  \par mild right neural foraminal narrowing but no significant left neural foraminal narrowing or central   \par canal narrowing.     \par  \par  There are bilateral renal cysts as well as a small cyst within the inferior right hepatic lobe.  \par  \par  \par  \par  Impression:     \par  \par  Acute/subacute compression deformity of the L1 vertebral body resulting in 40% loss of vertebral   \par body height. Associated mild retropulsion of the posterior superior endplate results in mild central  \par canal narrowing and mild bilateral neural foraminal narrowing. No significant epidural hematoma.   \par There is also a mild compression deformity of the L2 vertebral body without significant loss of   \par vertebral body height.   \par  \par  Mild T2 signal hyperintensity within the S1 and S2 vertebral bodies, raising suspicion for subacute  \par nondisplaced sacral insufficiency fractures.  \par  \par  Multilevel discogenic degenerative disease and facet arthropathy of the lumbar spine, most   \par pronounced at L2-L3 where there is moderate central canal narrowing, severe left neural foraminal   \par narrowing and mild-to-moderate right neural foraminal narrowing as well as impingement on the   \par bilateral descending L3 nerve roots within the right and left lateral recesses. Additional varying   \par degrees of central canal and neural foraminal narrowing, as above.  \par \par XR LS 9/2019\par \par There is mild to moderate dextroscoliosis of the lumbar spine. The lumbar lordosis is grossly   \par maintained. There is degenerative grade 1 anterolisthesis of L4 on L5. The bones are diffusely   \par osteopenic. Diffuse osteopenia and scoliosis somewhat limits evaluation of the lateral projection.   \par Within this limitation, a mild age indeterminate compression of the superior endplate of L1 is   \par questioned. No other compression fractures are suspected. There are overall moderate degenerative   \par changes of the lumbar spine including disc space narrowing, endplate osteophyte formation, and facet  \par arthropathy. A large left lateral endplate osteophyte is noted at L2-3. A right hip replacement is   \par noted. Vascular calcifications and a left abdominal surgical chain suture are noted in the soft   \par tissues.  \par  \par  \par  IMPRESSION:  \par  \par  Question mild age-indeterminate compression of the superior endplate of L1. Lumbar scoliosis and   \par spondylosis.  \par  \par  [FreeTextEntry3] : go up/down stairs [FreeTextEntry2] : 22

## 2019-09-20 NOTE — REVIEW OF SYSTEMS
[Feeling Tired] : fatigue [Urinary Frequency] : urinary frequency [Urinary Urgency] : urinary urgency [de-identified] : weakness,memory loss

## 2019-09-23 NOTE — REVIEW OF SYSTEMS
[Feeling Poorly] : feeling poorly [Feeling Tired] : feeling tired [Incontinence] : incontinence [Joint Pain] : joint pain [Arthralgias] : arthralgias [Fever] : no fever [Chills] : no chills [Eye Pain] : no eye pain [Red Eyes] : eyes not red [Nosebleeds] : no nosebleeds [Nasal Discharge] : no nasal discharge [Sore Throat] : no sore throat [Hoarseness] : no hoarseness [Heart Rate Is Slow] : the heart rate was not slow [Heart Rate Is Fast] : the heart rate was not fast [Chest Pain] : no chest pain [Palpitations] : no palpitations [Shortness Of Breath] : no shortness of breath [Wheezing] : no wheezing [Cough] : no cough [Abdominal Pain] : no abdominal pain [Vomiting] : no vomiting [Vaginal Discharge] : no vaginal discharge [Abn Vaginal Bleeding] : no unexplained vaginal bleeding [Skin Lesions] : no skin lesions [Skin Wound] : no skin wound [Dizziness] : no dizziness [Fainting] : no fainting [Change In Personality] : no personality change [Emotional Problems] : no emotional problems

## 2019-09-23 NOTE — PHYSICAL EXAM
[General Appearance - Alert] : alert [General Appearance - In No Acute Distress] : in no acute distress [General Appearance - Well Nourished] : well nourished [General Appearance - Well Developed] : well developed [Sclera] : the sclera and conjunctiva were normal [Extraocular Movements] : extraocular movements were intact [PERRL With Normal Accommodation] : pupils were equal in size, round, and reactive to light [Normal Oral Mucosa] : normal oral mucosa [No Oral Pallor] : no oral pallor [Neck Appearance] : the appearance of the neck was normal [Respiration, Rhythm And Depth] : normal respiratory rhythm and effort [Exaggerated Use Of Accessory Muscles For Inspiration] : no accessory muscle use [Heart Rate And Rhythm] : heart rate was normal and rhythm regular [Auscultation Breath Sounds / Voice Sounds] : lungs were clear to auscultation bilaterally [Heart Sounds Gallop] : no gallops [Heart Sounds Pericardial Friction Rub] : no pericardial rub [Bowel Sounds] : normal bowel sounds [Abdomen Soft] : soft [Abdomen Tenderness] : non-tender [Skin Color & Pigmentation] : normal skin color and pigmentation [] : no rash [Affect] : the affect was normal [Mood] : the mood was normal [FreeTextEntry1] : memory loss more apparent and worse during UTI

## 2019-09-23 NOTE — ASSESSMENT
[FreeTextEntry1] : Marked memory loss on my visit today\par Reviewed MRI Brain with patient and daughter today\par Discussed that there is certainly damage to the brain that is irreversible but given recent steroids, flexeril and uncontrolled pain unclear what her new baseline is\par medication management is required at this time\par continue HHA for ADL support\par given pain scale, high BP and pulse for patient, agree with uncontrolled pain as active issue\par will add low dose naproxen 250mg BID with food as mobic was stopped\par will stop asa and naproxen 7 days prior to any invasive procedure \par has follow up with pain management this Friday\par if no benefit from nsaids, will need to consider escalating pain management until procedure can be done\par \par deferring on Mohs surgery until painis controlled \par \par \par oxybutynin stopped\par mobic stopped\par \par \par

## 2019-09-23 NOTE — HISTORY OF PRESENT ILLNESS
[Moderate] : Stage: Moderate [Worse] : Status: Worse [Memory Lapses Or Loss] : worsened memory impairment [1] : 2) Feeling down, depressed, or hopeless for several days [PHQ-2 Score ___] : PHQ-2 Score [unfilled] [FreeTextEntry1] : Presenting with daughter from [place] today\par Had seen pain management earlier today\par Eager to review MRI results and consider injections or other treatment options as current regimen is not controlling pain.  Patients dementia limits her ability to give proper history.  when you ask her - she says she feels "fine."  When you ask her about back pain she states it is 7/10 and watching carefully you see she is grimacing when walking or changing position.\par s/p steroid pulse therapy and flexeril.  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

## 2019-10-02 ENCOUNTER — APPOINTMENT (OUTPATIENT)
Dept: GERIATRICS | Facility: CLINIC | Age: 84
End: 2019-10-02
Payer: MEDICARE

## 2019-10-02 VITALS — DIASTOLIC BLOOD PRESSURE: 80 MMHG | RESPIRATION RATE: 18 BRPM | HEART RATE: 80 BPM | SYSTOLIC BLOOD PRESSURE: 140 MMHG

## 2019-10-02 PROCEDURE — 99214 OFFICE O/P EST MOD 30 MIN: CPT

## 2019-10-02 NOTE — REVIEW OF SYSTEMS
[Feeling Poorly] : feeling poorly [Feeling Tired] : feeling tired [Incontinence] : incontinence [Arthralgias] : arthralgias [Joint Pain] : joint pain [Fever] : no fever [Chills] : no chills [Eye Pain] : no eye pain [Nosebleeds] : no nosebleeds [Red Eyes] : eyes not red [Nasal Discharge] : no nasal discharge [Sore Throat] : no sore throat [Hoarseness] : no hoarseness [Heart Rate Is Slow] : the heart rate was not slow [Heart Rate Is Fast] : the heart rate was not fast [Palpitations] : no palpitations [Chest Pain] : no chest pain [Shortness Of Breath] : no shortness of breath [Wheezing] : no wheezing [Cough] : no cough [Abdominal Pain] : no abdominal pain [Vomiting] : no vomiting [Vaginal Discharge] : no vaginal discharge [Abn Vaginal Bleeding] : no unexplained vaginal bleeding [Skin Wound] : no skin wound [Skin Lesions] : no skin lesions [Fainting] : no fainting [Dizziness] : no dizziness [Change In Personality] : no personality change [Emotional Problems] : no emotional problems

## 2019-10-02 NOTE — HISTORY OF PRESENT ILLNESS
[Moderate] : Stage: Moderate [Worse] : Status: Worse [Memory Lapses Or Loss] : worsened memory impairment [1] : 2) Feeling down, depressed, or hopeless for several days [PHQ-2 Score ___] : PHQ-2 Score [unfilled] [FreeTextEntry1] : For SI joint injection on Friday\par ASA and naproxen have been stopped appropriately\par Pain levels remain high but she is on tylenol TID standing\par pain is tolerable when she lays down\par worse with ambulation\par s/p steroid pulse therapy and flexeril.  \par medication management is required\par now with urianry frequency\par h.o frequent UTI\par \par \par \par \par

## 2019-10-02 NOTE — ASSESSMENT
[FreeTextEntry1] : ASA naproxen stopped for injection this friday\par Cannot obtain urine sample on this visit\par will prescribe bactrim BID x 5 days given frequent UTI and likelihood of worsening symptoms prior to SI joint injection.  Can resume ASA and naproxen after injection.  If no relief may need to discuss alternative treatment options.  She has a markedly high pain threshold but is in terrible pain.  This is limited her mobility and likely starting to worsen her psyche.  Marked memory loss persists.  [facility] prepares her medications.  they are aware that I am starting an antibiotic.  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

## 2019-10-04 ENCOUNTER — RESULT REVIEW (OUTPATIENT)
Age: 84
End: 2019-10-04

## 2019-10-04 ENCOUNTER — APPOINTMENT (OUTPATIENT)
Dept: PAIN MANAGEMENT | Facility: HOSPITAL | Age: 84
End: 2019-10-04

## 2019-10-09 ENCOUNTER — APPOINTMENT (OUTPATIENT)
Dept: GERIATRICS | Facility: CLINIC | Age: 84
End: 2019-10-09
Payer: MEDICARE

## 2019-10-09 VITALS
OXYGEN SATURATION: 95 % | DIASTOLIC BLOOD PRESSURE: 78 MMHG | HEART RATE: 74 BPM | SYSTOLIC BLOOD PRESSURE: 110 MMHG | RESPIRATION RATE: 20 BRPM | TEMPERATURE: 97 F

## 2019-10-09 PROCEDURE — 99214 OFFICE O/P EST MOD 30 MIN: CPT

## 2019-10-09 RX ORDER — DOXYCYCLINE 100 MG/1
100 CAPSULE ORAL
Qty: 28 | Refills: 0 | Status: DISCONTINUED | COMMUNITY
Start: 2019-07-23

## 2019-10-09 RX ORDER — SULFAMETHOXAZOLE AND TRIMETHOPRIM 800; 160 MG/1; MG/1
800-160 TABLET ORAL
Qty: 10 | Refills: 0 | Status: DISCONTINUED | COMMUNITY
Start: 2019-10-02 | End: 2019-10-09

## 2019-10-11 NOTE — ASSESSMENT
[FreeTextEntry1] : Removed dressing, requiring cutting hair and gentle removal of hypafix tape\par with no incidence. Wounds appear clean with stitches intact. Applied bacitracin and open to air\par as directed by surgeon. Should do this daily. May shower and wash hair. Pt to go to [employer]\par salon for hair wash\par Continue to hold aspirin for one more week\par Unclear about etiology of plantar rash - may require derm consult after pt has healed from\par MOHS. D/w pcp Dr. HARRINGTON

## 2019-10-11 NOTE — REVIEW OF SYSTEMS
[Fever] : no fever [Chills] : no chills [Eye Pain] : no eye pain [Eyesight Problems] : no eyesight problems [Discharge From Eyes] : no purulent discharge from the eyes [Eyes Itch] : no itching of the eyes [Itching] : itching [Negative] : Respiratory [de-identified] : dtr states mother has had similar red spot in the summer which starts as a larger diffuse area of redness and then becomes smaller and deeper red color before it disappears

## 2019-10-11 NOTE — HISTORY OF PRESENT ILLNESS
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 May shower on 10/9.\par Pt and dtr unable to remove dressing alone and here for assistance and\par eval

## 2019-10-11 NOTE — PHYSICAL EXAM
[General Appearance - In No Acute Distress] : in no acute distress [General Appearance - Alert] : alert [] : no respiratory distress [Respiration, Rhythm And Depth] : normal respiratory rhythm and effort [PERRL With Normal Accommodation] : pupils were equal in size, round, and reactive to light [Heart Rate And Rhythm] : heart rate was normal and rhythm regular [Apical Impulse] : the apical impulse was normal [Extraocular Movements] : extraocular movements were intact [FreeTextEntry1] : OS with some redness, perioptical edema and erythema [Skin Injury 1] : Skin injury: [Location] : which was located [Lesions Forehead] : on the left forehead [___cm] : [unfilled] ~[facility] [2nd skin injury] : A second skin injury was noted [Lesions On Face] : on the left face [Location: ___] : [unfilled] [FreeTextEntry2] : Left hand center of palm with circular quarter sized red spot, left outer leg with diffuse larger red spot. No other apparent rash. surgical wounds with small pinpoint fresh bleeding around stitches. Multiple small stitches intact

## 2019-10-18 ENCOUNTER — APPOINTMENT (OUTPATIENT)
Dept: PAIN MANAGEMENT | Facility: CLINIC | Age: 84
End: 2019-10-18
Payer: MEDICARE

## 2019-10-18 VITALS
WEIGHT: 145 LBS | SYSTOLIC BLOOD PRESSURE: 132 MMHG | BODY MASS INDEX: 22.76 KG/M2 | DIASTOLIC BLOOD PRESSURE: 83 MMHG | HEIGHT: 67 IN

## 2019-10-18 DIAGNOSIS — M79.18 MYALGIA, OTHER SITE: ICD-10-CM

## 2019-10-18 DIAGNOSIS — M53.3 SACROCOCCYGEAL DISORDERS, NOT ELSEWHERE CLASSIFIED: ICD-10-CM

## 2019-10-18 DIAGNOSIS — M47.817 SPONDYLOSIS W/OUT MYELOPATHY OR RADICULOPATHY, LUMBOSACRAL REGION: ICD-10-CM

## 2019-10-18 PROCEDURE — 99214 OFFICE O/P EST MOD 30 MIN: CPT

## 2019-10-23 ENCOUNTER — APPOINTMENT (OUTPATIENT)
Dept: GERIATRICS | Facility: CLINIC | Age: 84
End: 2019-10-23

## 2019-10-23 ENCOUNTER — OTHER (OUTPATIENT)
Age: 84
End: 2019-10-23

## 2019-10-23 ENCOUNTER — RESULT REVIEW (OUTPATIENT)
Age: 84
End: 2019-10-23

## 2019-10-24 ENCOUNTER — APPOINTMENT (OUTPATIENT)
Dept: GERIATRICS | Facility: CLINIC | Age: 84
End: 2019-10-24
Payer: MEDICARE

## 2019-10-24 VITALS
DIASTOLIC BLOOD PRESSURE: 97 MMHG | WEIGHT: 140 LBS | SYSTOLIC BLOOD PRESSURE: 150 MMHG | HEART RATE: 84 BPM | BODY MASS INDEX: 21.93 KG/M2 | TEMPERATURE: 97.9 F | OXYGEN SATURATION: 98 %

## 2019-10-24 DIAGNOSIS — M48.061 SPINAL STENOSIS, LUMBAR REGION WITHOUT NEUROGENIC CLAUDICATION: ICD-10-CM

## 2019-10-24 PROCEDURE — 99215 OFFICE O/P EST HI 40 MIN: CPT

## 2019-10-24 NOTE — PHYSICAL EXAM
[General Appearance - Alert] : alert [General Appearance - In No Acute Distress] : in no acute distress [General Appearance - Well Nourished] : well nourished [Sclera] : the sclera and conjunctiva were normal [General Appearance - Well Developed] : well developed [PERRL With Normal Accommodation] : pupils were equal in size, round, and reactive to light [Extraocular Movements] : extraocular movements were intact [Neck Appearance] : the appearance of the neck was normal [Exaggerated Use Of Accessory Muscles For Inspiration] : no accessory muscle use [Respiration, Rhythm And Depth] : normal respiratory rhythm and effort [Auscultation Breath Sounds / Voice Sounds] : lungs were clear to auscultation bilaterally [Heart Rate And Rhythm] : heart rate was normal and rhythm regular [Heart Sounds Gallop] : no gallops [Heart Sounds Pericardial Friction Rub] : no pericardial rub [Bowel Sounds] : normal bowel sounds [Abdomen Soft] : soft [Skin Color & Pigmentation] : normal skin color and pigmentation [Abdomen Tenderness] : non-tender [] : no rash [Affect] : the affect was normal [FreeTextEntry1] : memory loss more apparent and worse during UTI [Mood] : the mood was normal [Normal Oral Mucosa] : normal oral mucosa [No Oral Pallor] : no oral pallor

## 2019-10-24 NOTE — REVIEW OF SYSTEMS
[Fever] : no fever [Chills] : no chills [Feeling Poorly] : feeling poorly [Feeling Tired] : feeling tired [Eye Pain] : no eye pain [Red Eyes] : eyes not red [Nosebleeds] : no nosebleeds [Nasal Discharge] : no nasal discharge [Sore Throat] : no sore throat [Hoarseness] : no hoarseness [Heart Rate Is Slow] : the heart rate was not slow [Heart Rate Is Fast] : the heart rate was not fast [Chest Pain] : no chest pain [Palpitations] : no palpitations [Shortness Of Breath] : no shortness of breath [Wheezing] : no wheezing [Cough] : no cough [Abdominal Pain] : no abdominal pain [Vomiting] : no vomiting [Incontinence] : incontinence [Vaginal Discharge] : no vaginal discharge [Abn Vaginal Bleeding] : no unexplained vaginal bleeding [Arthralgias] : arthralgias [Joint Pain] : joint pain [Skin Lesions] : no skin lesions [Skin Wound] : no skin wound [Dizziness] : no dizziness [Fainting] : no fainting [Change In Personality] : no personality change [Emotional Problems] : no emotional problems

## 2019-10-24 NOTE — ASSESSMENT
[Procedure Low Risk] : the procedure risk is low [Optimized for Surgery] : the patient is optimized for surgery [Patient Intermediate Risk] : the patient is an intermediate risk [As per surgery] : as per surgery [Continue] : Continue medications as currently directed [FreeTextEntry1] : Patient for kyphoplasty on 10/29/19\par ASA and nsaids are currently on hold, appropriately\par Revised RCRI score 1 due to TIA/CVA\par But given severe pain and goal of pain management and focus on palliation family and patient willing to risk worsening cognition for better pain control\par Reviewed labs EKG CXR and urine\par will treat with bactrim BID x 3 days prophylactically \par can resume asa and nsaids after surgery\par benefits outweigh risks , may proceed\par will go to [facility] after kyphoplasty\par son aware and present for appointment\par coordinated with resident care center at [facility] as well\par if pain is not controlled s.p procedure will need to consider opiods\par concurrently with osteoporosis treatment\par prognosis guarded

## 2019-10-24 NOTE — HISTORY OF PRESENT ILLNESS
[Scheduled Procedure ___] : a [unfilled] [Preoperative Visit] : for a medical evaluation prior to surgery [Surgeon Name ___] : surgeon: [unfilled] [Date of Surgery ___] : on [unfilled] [Fever] : no fever [Fatigue] : fatigue [Chills] : no chills [Cough] : no cough [Chest Pain] : no chest pain [Dyspnea] : no dyspnea [Nausea] : no nausea [Dysuria] : no dysuria [Urinary Frequency] : urinary frequency [Vomiting] : no vomiting [Diarrhea] : no diarrhea [Abdominal Pain] : no abdominal pain [Easy Bruising] : easy bruising [Lower Extremity Swelling] : no lower extremity swelling [Poor Exercise Tolerance] : poor exercise tolerance [Cardiovascular Disease] : cardiovascular disease [Diabetes] : no diabetes [Pulmonary Disease] : no pulmonary disease [Anti-Platelet Agents] : anti-platelet agents [Renal Disease] : no renal disease [Nicotine Dependence] : no nicotine dependence [Alcohol Use] : no  alcohol use [Thromboembolic Problems] : no thromboembolic problems [GI Disease] : no gastrointestinal disease [Sleep Apnea] : no sleep apnea [Frequent use of NSAIDs] : frequent use of NSAIDs [Impaired Immunity] : no impaired immunity [Transfusion Reaction] : no transfusion reaction [Steroid Use in Last 6 Months] : no steroid use in the last six months [Frequent Aspirin Use] : frequent aspirin use [Prior Anesthesia] : No prior anesthesia [Prev Anesthesia Reaction] : no previous anesthesia reaction [Electrocardiogram] : ~T an ECG ~C was performed [Fair] : Fair [Anesthesia Reaction] : no anesthesia reaction [Clotting Disorder] : no clotting disorder [Sudden Death] : no sudden death [Bleeding Disorder] : no bleeding disorder [de-identified] : Dr. Ben Merlos

## 2019-10-28 ENCOUNTER — RESULT REVIEW (OUTPATIENT)
Age: 84
End: 2019-10-28

## 2019-10-29 ENCOUNTER — APPOINTMENT (OUTPATIENT)
Dept: PAIN MANAGEMENT | Facility: HOSPITAL | Age: 84
End: 2019-10-29

## 2019-10-29 ENCOUNTER — RESULT REVIEW (OUTPATIENT)
Age: 84
End: 2019-10-29

## 2019-10-29 NOTE — PHYSICAL EXAM
[Normal] : Well developed, in no acute distress, alert and oriented to person, place and time [Spine: Flexion to ___ degrees, without pain] : spine: flexion to [unfilled] degrees, without pain [Normal muscle bulk without asymmetry] : normal muscle bulk without asymmetry [SI Provacative Testing] : positive SI Provacative Testing [de-identified] : \par \par  [de-identified] : moderate pain on palpation of upper lumbar spine

## 2019-10-29 NOTE — HISTORY OF PRESENT ILLNESS
[___ mths] : [unfilled] month(s) ago [Back Pain] : back pain [9] : a maximum pain level of 9/10 [Aching] : aching [Heat] : heat [8] : 1. What number best describes your pain on average in the past week? 8/10 pain [7] : 3. What number best describes how, during the past week, pain has interfered with your general activity? 7/10 pain [FreeTextEntry1] : Interval Note:\par s/p  LEFT SIJ steroid injection 10/4/19 with mild improvement in lower back pain.  patient also participating in PT and exercises without improvement in pain.  She states that pain over lower back has been debilitating and unable to tolerate ADLs. \par Since last visit the pain is not improved. Buttock pain has improved.  Pain is worse with ambulation. Denies any additional weakness, numbness, bowel/bladder dysfunction. \par \par \par HPI\par \par Ms. JUMA KEY is a 89 year F with pmhx of TIA on asa 81mg, hx of colon ca s/p resection, htn, lumbar discectomy and left hip replacement.  Presents with bilateral lower back pain for 1 month after possible falls.  \par \par \par Previous and current pain medications/doses/effects:\par \par Tylenol and Meloxicam with mild improvement\par \par Previous Pain Treatments:\par \par n/a\par Previous Pain Injections:\par \par LEFT SIJ steroid injection 10/4/19\par \par Previous Diagnostic Studies/Images:\par \par MRI LS 9/2019\par \par \par  There is an acute/subacute compression deformity of the L1 vertebral body resulting in   \par approximately 40% loss of vertebral body height. There is associated mild retropulsion of the   \par posterior superior endplate into the central canal resulting in mild central canal narrowing. No   \par significant epidural hematoma. There is a mild compression deformity of the L2 vertebral body   \par without significant loss of vertebral body height. There is minimal T2 signal hyperintensity within   \par the S1 and S2 vertebral bodies, suspicious for sequelae of nondisplaced sacral insufficiency   \par fractures.  \par  \par  There are 5 lumbar vertebral bodies.  The spinal cord termination is at the L1 level.  There is no   \par evidence of significant spondylolisthesis.  No areas of bone destruction or marrow replacement are   \par identified.    \par  \par  Evaluation of the individual levels is as follows:  \par  \par  T12/L1 level: Moderate bilateral facet arthropathy and posterior superior endplate retropulsion   \par results in mild central canal narrowing and mild bilateral neural foraminal narrowing.     \par  \par  L1/2 level: Moderate bilateral facet arthropathy and small broad-based posterior disc bulge with   \par superimposed small right intraforaminal disc protrusion result in mild bilateral neural foraminal   \par narrowing but no significant central canal narrowing.     \par  \par  L2/3 level: Moderate bilateral facet arthropathy and broad-based posterior disc bulge results in   \par mild-to-moderate right neural foraminal narrowing, severe left neural foraminal narrowing and   \par moderate central canal narrowing. There is impingement on the bilateral descending L3 nerve roots   \par within the right and left lateral recesses.     \par  \par  L3/4 level: Moderate bilateral facet arthropathy and small broad-based posterior disc bulge results  \par in mild to moderate right neural foraminal narrowing, moderate left neural foraminal narrowing and   \par mild to moderate central canal narrowing. There is impingement on the bilateral descending L4 nerve   \par roots within the right and left lateral recesses.     \par  \par  L4/5 level: Mild bilateral facet arthropathy and small broad-based posterior disc bulge with   \par superimposed small right intraforaminal disc protrusion result in mild-to-moderate right neural   \par foraminal narrowing, mild left neural foraminal narrowing but no significant central canal   \par narrowing.     \par  \par  L5/S1 level: Mild bilateral facet arthropathy and small broad-based posterior disc bulge results in  \par mild right neural foraminal narrowing but no significant left neural foraminal narrowing or central   \par canal narrowing.     \par  \par  There are bilateral renal cysts as well as a small cyst within the inferior right hepatic lobe.  \par  \par  \par  \par  Impression:     \par  \par  Acute/subacute compression deformity of the L1 vertebral body resulting in 40% loss of vertebral   \par body height. Associated mild retropulsion of the posterior superior endplate results in mild central  \par canal narrowing and mild bilateral neural foraminal narrowing. No significant epidural hematoma.   \par There is also a mild compression deformity of the L2 vertebral body without significant loss of   \par vertebral body height.   \par  \par  Mild T2 signal hyperintensity within the S1 and S2 vertebral bodies, raising suspicion for subacute  \par nondisplaced sacral insufficiency fractures.  \par  \par  Multilevel discogenic degenerative disease and facet arthropathy of the lumbar spine, most   \par pronounced at L2-L3 where there is moderate central canal narrowing, severe left neural foraminal   \par narrowing and mild-to-moderate right neural foraminal narrowing as well as impingement on the   \par bilateral descending L3 nerve roots within the right and left lateral recesses. Additional varying   \par degrees of central canal and neural foraminal narrowing, as above.  \par \par XR LS 9/2019\par \par There is mild to moderate dextroscoliosis of the lumbar spine. The lumbar lordosis is grossly   \par maintained. There is degenerative grade 1 anterolisthesis of L4 on L5. The bones are diffusely   \par osteopenic. Diffuse osteopenia and scoliosis somewhat limits evaluation of the lateral projection.   \par Within this limitation, a mild age indeterminate compression of the superior endplate of L1 is   \par questioned. No other compression fractures are suspected. There are overall moderate degenerative   \par changes of the lumbar spine including disc space narrowing, endplate osteophyte formation, and facet  \par arthropathy. A large left lateral endplate osteophyte is noted at L2-3. A right hip replacement is   \par noted. Vascular calcifications and a left abdominal surgical chain suture are noted in the soft   \par tissues.  \par  \par  \par  IMPRESSION:  \par  \par  Question mild age-indeterminate compression of the superior endplate of L1. Lumbar scoliosis and   \par spondylosis.  \par  \par  [FreeTextEntry3] : go up/down stairs [FreeTextEntry2] : 22

## 2019-10-29 NOTE — REVIEW OF SYSTEMS
[Feeling Tired] : fatigue [Urinary Frequency] : urinary frequency [Urinary Urgency] : urinary urgency [de-identified] : weakness,memory loss

## 2019-11-05 ENCOUNTER — RX RENEWAL (OUTPATIENT)
Age: 84
End: 2019-11-05

## 2019-11-22 ENCOUNTER — APPOINTMENT (OUTPATIENT)
Dept: GERIATRICS | Facility: CLINIC | Age: 84
End: 2019-11-22
Payer: MEDICARE

## 2019-11-22 ENCOUNTER — RESULT REVIEW (OUTPATIENT)
Age: 84
End: 2019-11-22

## 2019-11-22 PROCEDURE — 99215 OFFICE O/P EST HI 40 MIN: CPT

## 2019-11-25 VITALS
DIASTOLIC BLOOD PRESSURE: 80 MMHG | HEART RATE: 80 BPM | RESPIRATION RATE: 16 BRPM | SYSTOLIC BLOOD PRESSURE: 130 MMHG | TEMPERATURE: 98.1 F

## 2019-11-25 RX ORDER — SULFAMETHOXAZOLE AND TRIMETHOPRIM 800; 160 MG/1; MG/1
800-160 TABLET ORAL
Qty: 6 | Refills: 0 | Status: DISCONTINUED | COMMUNITY
Start: 2019-10-24 | End: 2019-11-25

## 2019-11-25 NOTE — HISTORY OF PRESENT ILLNESS
[Moderate] : Stage: Moderate [Memory Lapses Or Loss] : worsened memory impairment [Worse] : Status: Worse [PHQ-2 Score ___] : PHQ-2 Score [unfilled] [0] : 2) Feeling down, depressed, or hopeless: Not at all [FreeTextEntry1] : accompanied by son\par back pain persists\par not using tylenol and tramadol regularily\par urinary frequency - concern for UTI \par

## 2019-11-25 NOTE — ASSESSMENT
[FreeTextEntry1] : s/p  kyphoplasty on 10/29/19\par pain had improved but now worsening again\par will change meds on trial basis\par standing tylenol and standing tramadol in AM x 2 weeks\par will also check urine to assure no UTI\par \par son aware and present for appointment\par coordinated with [facility] as well\par

## 2019-12-17 ENCOUNTER — RX RENEWAL (OUTPATIENT)
Age: 84
End: 2019-12-17

## 2019-12-17 ENCOUNTER — MEDICATION RENEWAL (OUTPATIENT)
Age: 84
End: 2019-12-17

## 2019-12-26 ENCOUNTER — APPOINTMENT (OUTPATIENT)
Dept: FAMILY MEDICINE | Facility: ASSISTED LIVING FACILITY | Age: 84
End: 2019-12-26
Payer: MEDICARE

## 2019-12-26 PROCEDURE — 99308 SBSQ NF CARE LOW MDM 20: CPT

## 2020-01-14 DIAGNOSIS — E53.8 DEFICIENCY OF OTHER SPECIFIED B GROUP VITAMINS: ICD-10-CM

## 2020-01-22 ENCOUNTER — APPOINTMENT (OUTPATIENT)
Dept: GERIATRICS | Facility: CLINIC | Age: 85
End: 2020-01-22
Payer: MEDICARE

## 2020-01-22 VITALS — SYSTOLIC BLOOD PRESSURE: 108 MMHG | HEART RATE: 70 BPM | RESPIRATION RATE: 16 BRPM | DIASTOLIC BLOOD PRESSURE: 70 MMHG

## 2020-01-22 DIAGNOSIS — M17.0 BILATERAL PRIMARY OSTEOARTHRITIS OF KNEE: ICD-10-CM

## 2020-01-22 PROCEDURE — 99214 OFFICE O/P EST MOD 30 MIN: CPT

## 2020-01-22 RX ORDER — NAPROXEN 250 MG/1
250 TABLET ORAL
Qty: 60 | Refills: 0 | Status: DISCONTINUED | COMMUNITY
Start: 2019-09-23 | End: 2020-01-22

## 2020-01-22 RX ORDER — CHLORHEXIDINE GLUCONATE 4 %
1000 LIQUID (ML) TOPICAL DAILY
Refills: 0 | Status: DISCONTINUED | COMMUNITY
End: 2020-01-22

## 2020-01-22 RX ORDER — TRAMADOL HYDROCHLORIDE 50 MG/1
50 TABLET, COATED ORAL DAILY
Qty: 30 | Refills: 0 | Status: DISCONTINUED | COMMUNITY
Start: 2019-12-17 | End: 2020-01-22

## 2020-01-22 NOTE — HISTORY OF PRESENT ILLNESS
[FreeTextEntry1] : back pain improved\par only on mobic daily\par not even using prn tylenol or naproxen per [facility]\par feels she may have UTI\par +frequency\par new walker\par  [Stable] : Status: Stable [Moderate] : Stage: Moderate [0] : 2) Feeling down, depressed, or hopeless: Not at all [PHQ-2 Score ___] : PHQ-2 Score [unfilled]

## 2020-01-22 NOTE — ASSESSMENT
[FreeTextEntry1] : beta blocker dose increased on [place]\par will decrease back to once daliy dosing\par check BP daily at [facility] x 10 days to see which dose is warranted\par dc naproxen\par continue daily mobic and PRN tylenol\par \par will also check urine to assure no UTI\par \par med list to be scanned today\par

## 2020-01-22 NOTE — REVIEW OF SYSTEMS
[Fever] : no fever [Feeling Poorly] : feeling poorly [Chills] : no chills [Feeling Tired] : feeling tired [Red Eyes] : eyes not red [Eye Pain] : no eye pain [Nasal Discharge] : no nasal discharge [Nosebleeds] : no nosebleeds [Sore Throat] : no sore throat [Hoarseness] : no hoarseness [Heart Rate Is Slow] : the heart rate was not slow [Heart Rate Is Fast] : the heart rate was not fast [Chest Pain] : no chest pain [Palpitations] : no palpitations [Wheezing] : no wheezing [Shortness Of Breath] : no shortness of breath [Abdominal Pain] : no abdominal pain [Cough] : no cough [Vomiting] : no vomiting [Vaginal Discharge] : no vaginal discharge [Incontinence] : incontinence [Abn Vaginal Bleeding] : no unexplained vaginal bleeding [Joint Pain] : joint pain [Arthralgias] : arthralgias [Skin Wound] : no skin wound [Skin Lesions] : no skin lesions [Fainting] : no fainting [Dizziness] : no dizziness [Change In Personality] : no personality change [Emotional Problems] : no emotional problems

## 2020-01-22 NOTE — PHYSICAL EXAM
[General Appearance - In No Acute Distress] : in no acute distress [General Appearance - Alert] : alert [General Appearance - Well Developed] : well developed [General Appearance - Well Nourished] : well nourished [PERRL With Normal Accommodation] : pupils were equal in size, round, and reactive to light [Sclera] : the sclera and conjunctiva were normal [Extraocular Movements] : extraocular movements were intact [Normal Oral Mucosa] : normal oral mucosa [No Oral Pallor] : no oral pallor [Neck Appearance] : the appearance of the neck was normal [Respiration, Rhythm And Depth] : normal respiratory rhythm and effort [Exaggerated Use Of Accessory Muscles For Inspiration] : no accessory muscle use [Auscultation Breath Sounds / Voice Sounds] : lungs were clear to auscultation bilaterally [Heart Rate And Rhythm] : heart rate was normal and rhythm regular [Heart Sounds Gallop] : no gallops [Heart Sounds Pericardial Friction Rub] : no pericardial rub [Bowel Sounds] : normal bowel sounds [Abdomen Soft] : soft [Abdomen Tenderness] : non-tender [Skin Color & Pigmentation] : normal skin color and pigmentation [] : no rash [Affect] : the affect was normal [FreeTextEntry1] : memory loss more apparent and worse during UTI [Mood] : the mood was normal

## 2020-02-05 ENCOUNTER — APPOINTMENT (OUTPATIENT)
Dept: GERIATRICS | Facility: CLINIC | Age: 85
End: 2020-02-05

## 2020-02-06 ENCOUNTER — APPOINTMENT (OUTPATIENT)
Dept: GERIATRICS | Facility: CLINIC | Age: 85
End: 2020-02-06

## 2020-02-13 ENCOUNTER — APPOINTMENT (OUTPATIENT)
Dept: GERIATRICS | Facility: CLINIC | Age: 85
End: 2020-02-13
Payer: MEDICARE

## 2020-02-13 VITALS
TEMPERATURE: 97 F | SYSTOLIC BLOOD PRESSURE: 120 MMHG | OXYGEN SATURATION: 96 % | DIASTOLIC BLOOD PRESSURE: 60 MMHG | RESPIRATION RATE: 18 BRPM | HEART RATE: 73 BPM

## 2020-02-13 PROCEDURE — 99213 OFFICE O/P EST LOW 20 MIN: CPT

## 2020-02-13 RX ORDER — POLYETHYLENE GLYCOL 400 AND PROPYLENE GLYCOL 4; 3 MG/ML; MG/ML
0.4-0.3 SOLUTION/ DROPS OPHTHALMIC 3 TIMES DAILY
Qty: 1 | Refills: 0 | Status: COMPLETED | OUTPATIENT
Start: 2020-02-13 | End: 2020-02-27

## 2020-02-13 NOTE — PHYSICAL EXAM
[General Appearance - Alert] : alert [General Appearance - In No Acute Distress] : in no acute distress [Subconjunctival Hemorrhage Right Eye] : subconjunctival hemorrhage [] : no respiratory distress [Heart Rate And Rhythm] : heart rate was normal and rhythm regular [Respiration, Rhythm And Depth] : normal respiratory rhythm and effort [Apical Impulse] : the apical impulse was normal [FreeTextEntry1] : OS with chronic ectropion lower lid, no discharge from eyes, no flaking

## 2020-02-13 NOTE — HISTORY OF PRESENT ILLNESS
[FreeTextEntry1] : right eye has been itching for a few days\par pt thinks it "looks like my left eye with drooping lid"\par plans to see her eye doctor tomorrow\par

## 2020-02-19 ENCOUNTER — APPOINTMENT (OUTPATIENT)
Dept: GERIATRICS | Facility: CLINIC | Age: 85
End: 2020-02-19
Payer: MEDICARE

## 2020-02-19 VITALS — SYSTOLIC BLOOD PRESSURE: 118 MMHG | RESPIRATION RATE: 18 BRPM | DIASTOLIC BLOOD PRESSURE: 60 MMHG | HEART RATE: 76 BPM

## 2020-02-19 DIAGNOSIS — H57.89 OTHER SPECIFIED DISORDERS OF EYE AND ADNEXA: ICD-10-CM

## 2020-02-19 PROCEDURE — 99214 OFFICE O/P EST MOD 30 MIN: CPT

## 2020-02-19 NOTE — PHYSICAL EXAM
[General Appearance - Alert] : alert [General Appearance - In No Acute Distress] : in no acute distress [General Appearance - Well Nourished] : well nourished [General Appearance - Well Developed] : well developed [PERRL With Normal Accommodation] : pupils were equal in size, round, and reactive to light [Extraocular Movements] : extraocular movements were intact [Normal Oral Mucosa] : normal oral mucosa [No Oral Pallor] : no oral pallor [Neck Appearance] : the appearance of the neck was normal [Respiration, Rhythm And Depth] : normal respiratory rhythm and effort [Exaggerated Use Of Accessory Muscles For Inspiration] : no accessory muscle use [Auscultation Breath Sounds / Voice Sounds] : lungs were clear to auscultation bilaterally [Heart Rate And Rhythm] : heart rate was normal and rhythm regular [Heart Sounds Gallop] : no gallops [Heart Sounds Pericardial Friction Rub] : no pericardial rub [Bowel Sounds] : normal bowel sounds [Skin Color & Pigmentation] : normal skin color and pigmentation [Abdomen Tenderness] : non-tender [Abdomen Soft] : soft [] : no rash [Affect] : the affect was normal [Mood] : the mood was normal [FreeTextEntry1] : memory loss more apparent

## 2020-02-19 NOTE — HISTORY OF PRESENT ILLNESS
[Moderate] : Stage: Moderate [Stable] : Status: Stable [Memory Lapses Or Loss] : stable memory impairment [0] : 2) Feeling down, depressed, or hopeless: Not at all [PHQ-2 Score ___] : PHQ-2 Score [unfilled] [FreeTextEntry1] : med management [facility]\par recent issue where she was driving to hospital\par now keys have been taken away\par new walker \par eyes are red BL\par has seen ophthalmologist\par now on drops for eye dryness\par \par

## 2020-02-19 NOTE — ASSESSMENT
[FreeTextEntry1] : beta blocker dose increased on [place]water\par will decrease back to once daliy dosing\par back pain is improved\par still has bad days but using PRN OTC medications\par contiue systane recently increased to QID by opthalmologist\par will start claritin on trial basis x 2 weeks only as she is so uncomfirtable\par patient will call me with name of eye doctor after this visit \par may need ectroprion surgery afteralll\par \par med list to be scanned today\par

## 2020-02-19 NOTE — REVIEW OF SYSTEMS
[Feeling Poorly] : feeling poorly [Feeling Tired] : feeling tired [Incontinence] : incontinence [Arthralgias] : arthralgias [Joint Pain] : joint pain [Chills] : no chills [Fever] : no fever [Eye Pain] : no eye pain [Red Eyes] : eyes not red [Nosebleeds] : no nosebleeds [Nasal Discharge] : no nasal discharge [Sore Throat] : no sore throat [Heart Rate Is Slow] : the heart rate was not slow [Hoarseness] : no hoarseness [Heart Rate Is Fast] : the heart rate was not fast [Chest Pain] : no chest pain [Palpitations] : no palpitations [Shortness Of Breath] : no shortness of breath [Wheezing] : no wheezing [Cough] : no cough [Abdominal Pain] : no abdominal pain [Vomiting] : no vomiting [Vaginal Discharge] : no vaginal discharge [Abn Vaginal Bleeding] : no unexplained vaginal bleeding [Skin Wound] : no skin wound [Skin Lesions] : no skin lesions [Dizziness] : no dizziness [Fainting] : no fainting [Emotional Problems] : no emotional problems [Change In Personality] : no personality change

## 2020-03-18 ENCOUNTER — APPOINTMENT (OUTPATIENT)
Dept: GERIATRICS | Facility: CLINIC | Age: 85
End: 2020-03-18
Payer: MEDICARE

## 2020-03-18 VITALS — RESPIRATION RATE: 16 BRPM | DIASTOLIC BLOOD PRESSURE: 70 MMHG | HEART RATE: 70 BPM | SYSTOLIC BLOOD PRESSURE: 120 MMHG

## 2020-03-18 PROCEDURE — 99214 OFFICE O/P EST MOD 30 MIN: CPT

## 2020-03-18 NOTE — REVIEW OF SYSTEMS
[Feeling Poorly] : feeling poorly [Feeling Tired] : feeling tired [Red Eyes] : red eyes [Incontinence] : incontinence [Arthralgias] : arthralgias [Joint Pain] : joint pain [Fever] : no fever [Chills] : no chills [Eye Pain] : no eye pain [Nosebleeds] : no nosebleeds [Nasal Discharge] : no nasal discharge [Sore Throat] : no sore throat [Hoarseness] : no hoarseness [Heart Rate Is Slow] : the heart rate was not slow [Heart Rate Is Fast] : the heart rate was not fast [Chest Pain] : no chest pain [Palpitations] : no palpitations [Shortness Of Breath] : no shortness of breath [Wheezing] : no wheezing [Cough] : no cough [Abdominal Pain] : no abdominal pain [Vomiting] : no vomiting [Vaginal Discharge] : no vaginal discharge [Abn Vaginal Bleeding] : no unexplained vaginal bleeding [Skin Lesions] : no skin lesions [Skin Wound] : no skin wound [Dizziness] : no dizziness [Fainting] : no fainting [Change In Personality] : no personality change [Emotional Problems] : no emotional problems

## 2020-03-18 NOTE — HISTORY OF PRESENT ILLNESS
[Moderate] : Stage: Moderate [Stable] : Status: Stable [Memory Lapses Or Loss] : stable memory impairment [0] : 2) Feeling down, depressed, or hopeless: Not at all [PHQ-2 Score ___] : PHQ-2 Score [unfilled] [FreeTextEntry1] : med management with [facility]\par less falls with new walker \par eyes are red BL\par has seen ophthalmologist -  Dr. Ko\par now on drops for eye dryness\par now interested in ectropion repair surgery\par but all elective surgeries are on hold due to risk of corona virus covid 19\par

## 2020-03-18 NOTE — PHYSICAL EXAM
[General Appearance - Alert] : alert [General Appearance - In No Acute Distress] : in no acute distress [General Appearance - Well Nourished] : well nourished [General Appearance - Well Developed] : well developed [PERRL With Normal Accommodation] : pupils were equal in size, round, and reactive to light [Extraocular Movements] : extraocular movements were intact [Normal Oral Mucosa] : normal oral mucosa [No Oral Pallor] : no oral pallor [Neck Appearance] : the appearance of the neck was normal [Respiration, Rhythm And Depth] : normal respiratory rhythm and effort [Exaggerated Use Of Accessory Muscles For Inspiration] : no accessory muscle use [Auscultation Breath Sounds / Voice Sounds] : lungs were clear to auscultation bilaterally [Heart Rate And Rhythm] : heart rate was normal and rhythm regular [Heart Sounds Gallop] : no gallops [Heart Sounds Pericardial Friction Rub] : no pericardial rub [Bowel Sounds] : normal bowel sounds [Abdomen Soft] : soft [Abdomen Tenderness] : non-tender [Skin Color & Pigmentation] : normal skin color and pigmentation [] : no rash [Affect] : the affect was normal [Mood] : the mood was normal [FreeTextEntry1] : memory loss more apparent

## 2020-03-18 NOTE — ASSESSMENT
[FreeTextEntry1] : beta blocker dose increased on [place]\par will decrease back to once daliy dosing\par back pain stable\par still has bad days but using PRN OTC medications\par continue systane recently increased to QID by ophthalmologist\par trialed claritin with relief\par no lnoger painful\par \par may need L ectropion surgery after all\par \par med list to be scanned\par

## 2020-03-28 ENCOUNTER — RX RENEWAL (OUTPATIENT)
Age: 85
End: 2020-03-28

## 2020-03-30 ENCOUNTER — RX RENEWAL (OUTPATIENT)
Age: 85
End: 2020-03-30

## 2020-03-31 ENCOUNTER — APPOINTMENT (OUTPATIENT)
Dept: GERIATRICS | Facility: CLINIC | Age: 85
End: 2020-03-31
Payer: MEDICARE

## 2020-03-31 VITALS
HEART RATE: 68 BPM | OXYGEN SATURATION: 98 % | RESPIRATION RATE: 16 BRPM | DIASTOLIC BLOOD PRESSURE: 80 MMHG | TEMPERATURE: 97.4 F | SYSTOLIC BLOOD PRESSURE: 144 MMHG

## 2020-03-31 PROCEDURE — 99214 OFFICE O/P EST MOD 30 MIN: CPT

## 2020-03-31 NOTE — PHYSICAL EXAM
[General Appearance - Alert] : alert [General Appearance - In No Acute Distress] : in no acute distress [General Appearance - Well Nourished] : well nourished [General Appearance - Well Developed] : well developed [Sclera] : the sclera and conjunctiva were normal [PERRL With Normal Accommodation] : pupils were equal in size, round, and reactive to light [] : no respiratory distress [Respiration, Rhythm And Depth] : normal respiratory rhythm and effort [Auscultation Breath Sounds / Voice Sounds] : lungs were clear to auscultation bilaterally [Apical Impulse] : the apical impulse was normal [Heart Rate And Rhythm] : heart rate was normal and rhythm regular [Pitting Edema] : pitting edema present [___ +] : bilateral [unfilled]+ pitting edema to the ankles [Abnormal Walk] : normal gait [Skin Injury 1] : Skin injury: [___cm] : a [unfilled] cm [Location] : which was located [Lesions Shin Right] : on the right shin [Details] : the wound [Serous Drainage] : serous drainage [FreeTextEntry1] : ambulating with walker [FreeTextEntry2] : weeping edema right leg from very small cut

## 2020-03-31 NOTE — HISTORY OF PRESENT ILLNESS
[FreeTextEntry1] : Edema on both legs with drops of fluid from right leg causing "my shoe to get wet

## 2020-03-31 NOTE — ASSESSMENT
[FreeTextEntry1] : dry dressing applied to wound, then tubigrip over all for light compression\par Pt instructed to keep her legs elevated as much as possible; \par wear tubigrip continuously for next 24 hrs\par She verbalized understanding\par d/w pcp Dr. HARRINGTON

## 2020-04-01 ENCOUNTER — APPOINTMENT (OUTPATIENT)
Dept: GERIATRICS | Facility: CLINIC | Age: 85
End: 2020-04-01
Payer: MEDICARE

## 2020-04-01 VITALS — HEART RATE: 74 BPM | TEMPERATURE: 97.6 F | RESPIRATION RATE: 20 BRPM

## 2020-04-01 PROCEDURE — 99213 OFFICE O/P EST LOW 20 MIN: CPT

## 2020-04-01 NOTE — PHYSICAL EXAM
[General Appearance - Alert] : alert [General Appearance - In No Acute Distress] : in no acute distress [Sclera] : the sclera and conjunctiva were normal [PERRL With Normal Accommodation] : pupils were equal in size, round, and reactive to light [Skin Injury 1] : Skin injury: [___cm] : a [unfilled] cm [Location] : which was located [Lesions Shin Right] : on the right shin [Details] : the wound [Serous Drainage] : serous drainage [FreeTextEntry1] : bilat le edema improved.  [FreeTextEntry2] : weeping edema right leg from very small cut

## 2020-04-01 NOTE — ASSESSMENT
[FreeTextEntry1] : Cleansed wound, reapplied dry dressing and  tubigrip\par Pt should keep dressing and tubigrip in place \par

## 2020-04-03 ENCOUNTER — APPOINTMENT (OUTPATIENT)
Dept: GERIATRICS | Facility: CLINIC | Age: 85
End: 2020-04-03
Payer: MEDICARE

## 2020-04-03 VITALS
DIASTOLIC BLOOD PRESSURE: 70 MMHG | SYSTOLIC BLOOD PRESSURE: 124 MMHG | TEMPERATURE: 97.4 F | HEART RATE: 70 BPM | RESPIRATION RATE: 18 BRPM

## 2020-04-03 PROCEDURE — 99213 OFFICE O/P EST LOW 20 MIN: CPT

## 2020-04-06 NOTE — PHYSICAL EXAM
[General Appearance - Alert] : alert [General Appearance - In No Acute Distress] : in no acute distress [] : no respiratory distress [Respiration, Rhythm And Depth] : normal respiratory rhythm and effort [FreeTextEntry1] : small skin tear has closed with fragile new skin; no draining from skin opening

## 2020-04-06 NOTE — ASSESSMENT
[FreeTextEntry1] : continue furosemide every other day\par d/c wound care of right leg (healed)\par continue tubigrip - on in am, may remove qhs\par Pt verbalized understanding; her nurse (who does prepour) was updated\par

## 2020-05-01 ENCOUNTER — APPOINTMENT (OUTPATIENT)
Dept: GERIATRICS | Facility: CLINIC | Age: 85
End: 2020-05-01
Payer: MEDICARE

## 2020-05-01 PROCEDURE — 99448 NTRPROF PH1/NTRNET/EHR 21-30: CPT

## 2020-05-20 ENCOUNTER — APPOINTMENT (OUTPATIENT)
Dept: GERIATRICS | Facility: CLINIC | Age: 85
End: 2020-05-20
Payer: MEDICARE

## 2020-05-20 PROCEDURE — 99214 OFFICE O/P EST MOD 30 MIN: CPT

## 2020-05-20 NOTE — DISCUSSION/SUMMARY
[Verbal consent obtained from patient] : the patient, [unfilled] [FreeTextEntry1] : Patient called with worsening urinary incontinence\par She does have advancing dementia, urinary continence and frequent UTIs\par she agrees to submit urine specimen\par likely UTI\par but if negative has been on oxybutynin in addition to myrbetriq in past\par may have to discuss but do not think oxybutynin is good idea in context of worsening cognition\par  [Time Spent: ___ minutes] : I have spent [unfilled] minutes with the patient on the telephone

## 2020-05-20 NOTE — PHYSICAL EXAM
[General Appearance - Alert] : alert [General Appearance - In No Acute Distress] : in no acute distress [] : no respiratory distress

## 2020-05-20 NOTE — REVIEW OF SYSTEMS
[Fever] : no fever [Feeling Tired] : feeling tired [Feeling Poorly] : feeling poorly [Chills] : no chills [Eye Pain] : no eye pain [Red Eyes] : red eyes [Nasal Discharge] : no nasal discharge [Nosebleeds] : no nosebleeds [Sore Throat] : no sore throat [Hoarseness] : no hoarseness [Heart Rate Is Slow] : the heart rate was not slow [Heart Rate Is Fast] : the heart rate was not fast [Chest Pain] : no chest pain [Palpitations] : no palpitations [Shortness Of Breath] : no shortness of breath [Wheezing] : no wheezing [Cough] : no cough [Abdominal Pain] : no abdominal pain [Vomiting] : no vomiting [Vaginal Discharge] : no vaginal discharge [Incontinence] : incontinence [Abn Vaginal Bleeding] : no unexplained vaginal bleeding [Arthralgias] : arthralgias [Joint Pain] : joint pain [Skin Lesions] : no skin lesions [Skin Wound] : no skin wound [Dizziness] : no dizziness [Fainting] : no fainting [Change In Personality] : no personality change [Emotional Problems] : no emotional problems

## 2020-05-20 NOTE — ASSESSMENT
[FreeTextEntry1] : will check UA Ucx\par rule out recurrent UTI\par to discuss chronic ppx (hipprex vs macrobid) pending results\par will collect sample at [facility] and run sample at [place]\par will update son once results are in to confirm he is okay with chronic ppx\par at this point given frequency of infections it is warranted\par watch for signs of allergies given reaction to bactrim recently\par L ectroprion surgery on hold\par dementia is advancing\par memroy loss profound

## 2020-05-20 NOTE — REASON FOR VISIT
[Home] : at home, [unfilled] , at the time of the visit. [Other Location: e.g. Home (Enter Location, City,State)___] : at [unfilled] [Verbal consent obtained from patient] : the patient, [unfilled] [Follow-Up] : a follow-up visit

## 2020-05-20 NOTE — HISTORY OF PRESENT ILLNESS
[FreeTextEntry1] : med management with [facility]\par less falls with new walker \[id number] so plans for ectroprion surgery are on hold\par treated for UTI 2 weeks prior\par now again with more incontinence\par has had allergies to meds in past\par has never been on UTI ppx\par poor memory  [Stable] : Status: Stable [Severe] : Stage: Severe [Memory Lapses Or Loss] : stable memory impairment [0] : 2) Feeling down, depressed, or hopeless: Not at all [PHQ-2 Score ___] : PHQ-2 Score [unfilled]

## 2020-05-21 ENCOUNTER — RESULT REVIEW (OUTPATIENT)
Age: 85
End: 2020-05-21

## 2020-05-22 ENCOUNTER — NON-APPOINTMENT (OUTPATIENT)
Age: 85
End: 2020-05-22

## 2020-05-25 ENCOUNTER — NON-APPOINTMENT (OUTPATIENT)
Age: 85
End: 2020-05-25

## 2020-06-03 ENCOUNTER — APPOINTMENT (OUTPATIENT)
Dept: GERIATRICS | Facility: CLINIC | Age: 85
End: 2020-06-03
Payer: MEDICARE

## 2020-06-03 PROCEDURE — 99214 OFFICE O/P EST MOD 30 MIN: CPT

## 2020-06-03 PROCEDURE — 99443: CPT | Mod: 95

## 2020-06-05 ENCOUNTER — RX RENEWAL (OUTPATIENT)
Age: 85
End: 2020-06-05

## 2020-06-05 RX ORDER — AMOXICILLIN AND CLAVULANATE POTASSIUM 500; 125 MG/1; MG/1
500-125 TABLET, FILM COATED ORAL
Qty: 14 | Refills: 0 | Status: COMPLETED | COMMUNITY
Start: 2020-05-04 | End: 2020-06-05

## 2020-06-05 NOTE — PHYSICAL EXAM
[General Appearance - Alert] : alert [General Appearance - In No Acute Distress] : in no acute distress [] : normal voice and communication

## 2020-06-05 NOTE — REASON FOR VISIT
[Acute] : an acute visit [Home] : at home, [unfilled] , at the time of the visit. [Other Location: e.g. Home (Enter Location, City,State)___] : at [unfilled] [Care Coordinator] : care coordinator [Verbal consent obtained from patient] : the patient, [unfilled]

## 2020-06-23 ENCOUNTER — RX RENEWAL (OUTPATIENT)
Age: 85
End: 2020-06-23

## 2020-07-01 ENCOUNTER — APPOINTMENT (OUTPATIENT)
Dept: GERIATRICS | Facility: CLINIC | Age: 85
End: 2020-07-01
Payer: MEDICARE

## 2020-07-01 DIAGNOSIS — Z86.79 PERSONAL HISTORY OF OTHER DISEASES OF THE CIRCULATORY SYSTEM: ICD-10-CM

## 2020-07-01 PROCEDURE — 99448 NTRPROF PH1/NTRNET/EHR 21-30: CPT

## 2020-07-01 NOTE — ASSESSMENT
[FreeTextEntry1] : now on macrobid ppx\par repeat urine clean\par advised will take time to take effect\par also can follow with urogynecology Dr. Cespedes\par unclear if other conservative options ie pessary are practical\par memory worsening \par left vm for son\par traveling to [place] is fine but she will need help\par memory advancing\par also frequent UTIs she will need urgent care of doctor to followwith\par also should notify [facility] prior to leaving to get 90 day supply of meds\par LE edema worsening per pateint\par but nto wanting to diurese given incontinence\par watch for now\par \par L ectroprion surgery on hold\par dementia is advancing\par memory loss profound

## 2020-07-01 NOTE — HISTORY OF PRESENT ILLNESS
[FreeTextEntry1] : med management with [facility]\par has had UTI several weeks a in a row now\par now on macrobid daiy for UTI ppx\par recent Urine testing showed NO infections\par symptoms marginally better per patient\par wanting to be w family in [place] over summer\par poor memory  [Severe] : Stage: Severe [Stable] : Status: Stable [Memory Lapses Or Loss] : stable memory impairment [0] : 2) Feeling down, depressed, or hopeless: Not at all [PHQ-2 Score ___] : PHQ-2 Score [unfilled]

## 2020-07-01 NOTE — REASON FOR VISIT
[Acute] : an acute visit [Home] : at home, [unfilled] , at the time of the visit. [Medical Office: ([facility])___] : at the medical office located in  [Care Coordinator] : care coordinator [Verbal consent obtained from patient] : the patient, [unfilled]

## 2020-07-01 NOTE — REVIEW OF SYSTEMS
[Feeling Poorly] : feeling poorly [Chills] : no chills [Fever] : no fever [Eye Pain] : no eye pain [Feeling Tired] : feeling tired [Red Eyes] : red eyes [Nosebleeds] : no nosebleeds [Nasal Discharge] : no nasal discharge [Sore Throat] : no sore throat [Hoarseness] : no hoarseness [Heart Rate Is Fast] : the heart rate was not fast [Heart Rate Is Slow] : the heart rate was not slow [Palpitations] : no palpitations [Chest Pain] : no chest pain [Wheezing] : no wheezing [Shortness Of Breath] : no shortness of breath [Vomiting] : no vomiting [Abdominal Pain] : no abdominal pain [Cough] : no cough [Abn Vaginal Bleeding] : no unexplained vaginal bleeding [Vaginal Discharge] : no vaginal discharge [Incontinence] : incontinence [Skin Lesions] : no skin lesions [Arthralgias] : arthralgias [Joint Pain] : joint pain [Dizziness] : no dizziness [Skin Wound] : no skin wound [Fainting] : no fainting [Change In Personality] : no personality change [Emotional Problems] : no emotional problems

## 2020-07-01 NOTE — DISCUSSION/SUMMARY
[FreeTextEntry1] : Patient called with worsening urinary incontinence\par She does have advancing dementia, urinary continence and frequent UTIs\par she agrees to submit urine specimen\par likely UTI\par but if negative has been on oxybutynin in addition to myrbetriq in past\par may have to discuss but do not think oxybutynin is good idea in context of worsening cognition\par  [Verbal consent obtained from patient] : the patient, [unfilled] [Time Spent: ___ minutes] : I have spent [unfilled] minutes with the patient on the telephone

## 2020-07-01 NOTE — PHYSICAL EXAM
[General Appearance - In No Acute Distress] : in no acute distress [General Appearance - Alert] : alert [] : no respiratory distress

## 2020-07-16 NOTE — ADDENDUM
[FreeTextEntry1] : Ucx reviewed will treat with augmentin x 7 days\par patient and son notified as well has [facility] to [facility] medicine

## 2020-07-16 NOTE — HISTORY OF PRESENT ILLNESS
[Severe] : Stage: Severe [Stable] : Status: Stable [Memory Lapses Or Loss] : stable memory impairment [0] : 2) Feeling down, depressed, or hopeless: Not at all [PHQ-2 Score ___] : PHQ-2 Score [unfilled] [FreeTextEntry1] : med management with [facility]\par has had UTI several weeks a in a row now\par symptoms are better on antibioitcs but resume after completing course\par recheck indicates recurrent UTI\par she is also having more incontinence due to UTIs\par has had allergies to meds in past - Bactrim\par last two treatment courses were with augmentin\par has never been on UTI ppx\par poor memory

## 2020-07-16 NOTE — DISCUSSION/SUMMARY
[Verbal consent obtained from patient] : the patient, [unfilled] [Time Spent: ___ minutes] : I have spent [unfilled] minutes with the patient on the telephone [FreeTextEntry1] : Patient called with worsening urinary incontinence\par She does have advancing dementia, urinary continence and frequent UTIs\par she agrees to submit urine specimen\par likely UTI\par but if negative has been on oxybutynin in addition to myrbetriq in past\par may have to discuss but do not think oxybutynin is good idea in context of worsening cognition\par

## 2020-07-16 NOTE — REVIEW OF SYSTEMS
[Feeling Poorly] : feeling poorly [Feeling Tired] : feeling tired [Red Eyes] : red eyes [Incontinence] : incontinence [Arthralgias] : arthralgias [Joint Pain] : joint pain [Fever] : no fever [Chills] : no chills [Eye Pain] : no eye pain [Nosebleeds] : no nosebleeds [Nasal Discharge] : no nasal discharge [Sore Throat] : no sore throat [Hoarseness] : no hoarseness [Heart Rate Is Slow] : the heart rate was not slow [Heart Rate Is Fast] : the heart rate was not fast [Chest Pain] : no chest pain [Palpitations] : no palpitations [Shortness Of Breath] : no shortness of breath [Wheezing] : no wheezing [Cough] : no cough [Vaginal Discharge] : no vaginal discharge [Abdominal Pain] : no abdominal pain [Vomiting] : no vomiting [Abn Vaginal Bleeding] : no unexplained vaginal bleeding [Skin Lesions] : no skin lesions [Skin Wound] : no skin wound [Dizziness] : no dizziness [Fainting] : no fainting [Emotional Problems] : no emotional problems [Change In Personality] : no personality change

## 2020-07-16 NOTE — ASSESSMENT
[FreeTextEntry1] : three weeks of UTI symptoms\par always worse off antibiotics - recurrence of symptoms\par Now will move forward with plan for antibiotics daily - UTI ppx\par This is in hopes of preventing UTI and improving quality of life\par given severe incontinenece with UTIs\par risk of antibiotic resistance\par will try macrobid\par first treat as acute infection macrobid BID then switch to ppx with daily dosing\par will check Ua Uc next week after about 4 days of treatment to see if cx is growing bacteria\par if so, macrobid would not be a good choice\par unforutnealy she does have allergy to sulfa\par augmentin works but not wanting to give her PNC daily\par could try hipprex but beware could be allergic\par rule out recurrent UTI\par to discuss chronic ppx (hipprex vs macrobid) pending results\par will collect sample at [facility] and run sample at [place]\par will update son once results are in to confirm he is okay with chronic ppx\par at this point given frequency of infections it is warranted\par watch for signs of allergies given reaction to bactrim recently\par L ectroprion surgery on hold\par dementia is advancing\par memroy loss profound

## 2020-09-09 ENCOUNTER — APPOINTMENT (OUTPATIENT)
Dept: GERIATRICS | Facility: ASSISTED LIVING FACILITY | Age: 85
End: 2020-09-09
Payer: MEDICARE

## 2020-09-09 VITALS
OXYGEN SATURATION: 99 % | TEMPERATURE: 97.3 F | SYSTOLIC BLOOD PRESSURE: 142 MMHG | HEART RATE: 81 BPM | RESPIRATION RATE: 20 BRPM | DIASTOLIC BLOOD PRESSURE: 70 MMHG

## 2020-09-09 DIAGNOSIS — Z87.440 PERSONAL HISTORY OF URINARY (TRACT) INFECTIONS: ICD-10-CM

## 2020-09-09 DIAGNOSIS — Z48.01 ENCOUNTER FOR CHANGE OR REMOVAL OF SURGICAL WOUND DRESSING: ICD-10-CM

## 2020-09-09 DIAGNOSIS — S81.801A UNSPECIFIED OPEN WOUND, RIGHT LOWER LEG, INITIAL ENCOUNTER: ICD-10-CM

## 2020-09-09 PROCEDURE — 99214 OFFICE O/P EST MOD 30 MIN: CPT

## 2020-09-09 RX ORDER — METOPROLOL SUCCINATE 25 MG/1
25 TABLET, EXTENDED RELEASE ORAL
Refills: 0 | Status: DISCONTINUED | COMMUNITY
Start: 2019-09-13 | End: 2020-09-09

## 2020-09-09 NOTE — REVIEW OF SYSTEMS
[Negative] : Heme/Lymph [FreeTextEntry8] : believes she has increased/worsening frequency although this is a chronic problem [FreeTextEntry9] : mild lower back pain - chronic all summer

## 2020-09-09 NOTE — PHYSICAL EXAM
[General Appearance - Alert] : alert [General Appearance - In No Acute Distress] : in no acute distress [General Appearance - Well Nourished] : well nourished [General Appearance - Well Developed] : well developed [General Appearance - Well-Appearing] : healthy appearing [PERRL With Normal Accommodation] : pupils were equal in size, round, and reactive to light [Sclera] : the sclera and conjunctiva were normal [Outer Ear] : the ears and nose were normal in appearance [Both Tympanic Membranes Were Examined] : both tympanic membranes were normal [Neck Appearance] : the appearance of the neck was normal [] : the neck was supple [Respiration, Rhythm And Depth] : normal respiratory rhythm and effort [Heart Rate And Rhythm] : heart rate was normal and rhythm regular [Apical Impulse] : the apical impulse was normal [___ +] : bilateral [unfilled]+ pretibial pitting edema [Abdomen Soft] : soft [No CVA Tenderness] : no ~M costovertebral angle tenderness [No Spinal Tenderness] : no spinal tenderness [Abnormal Walk] : normal gait [Skin Color & Pigmentation] : normal skin color and pigmentation [FreeTextEntry1] : R>L pitting edema

## 2020-09-09 NOTE — HISTORY OF PRESENT ILLNESS
[FreeTextEntry1] : Pt has been away in [place] for the summer. States she\par swam every day.\par does have chronic edema in both legs but feels it has worsened in the \par past week since being home.\par No SOB, no weakness, no falls, otherwise feeling well\par \par Pt called back after visit and had c/o of increased frequency which she \par forgot to tell me. She feels she may have a UTI

## 2020-09-09 NOTE — ASSESSMENT
[FreeTextEntry1] : tubigrip size F for light compression - on in am from toes to knees, off qhs\par discussed with visiting nurses who will train pt and encourage her in use of\par Educated pt to elevate legs whenever she is sitting\par Will check urine for UTI - pt is on Macrobid prophylactically\par

## 2020-09-16 ENCOUNTER — APPOINTMENT (OUTPATIENT)
Dept: GERIATRICS | Facility: CLINIC | Age: 85
End: 2020-09-16
Payer: MEDICARE

## 2020-09-16 VITALS — DIASTOLIC BLOOD PRESSURE: 70 MMHG | SYSTOLIC BLOOD PRESSURE: 130 MMHG | HEART RATE: 80 BPM | RESPIRATION RATE: 18 BRPM

## 2020-09-16 PROCEDURE — 99214 OFFICE O/P EST MOD 30 MIN: CPT

## 2020-09-16 NOTE — HISTORY OF PRESENT ILLNESS
[Moderate] : Stage: Moderate [Stable] : Status: Stable [Memory Lapses Or Loss] : stable memory impairment [0] : 2) Feeling down, depressed, or hopeless: Not at all [PHQ-2 Score ___] : PHQ-2 Score [unfilled] [FreeTextEntry1] : [place] for the summer - swam every day\par swelling in both ankles worse since returning home\par had frequency and nocturia\par on macrobid daily for prophylaxis \par recent urine testing negative\par had been on oxybutynin in past\par put off ectroprion surgery for now L eye\par \par

## 2020-09-16 NOTE — PHYSICAL EXAM
[General Appearance - Alert] : alert [General Appearance - Well Nourished] : well nourished [General Appearance - In No Acute Distress] : in no acute distress [PERRL With Normal Accommodation] : pupils were equal in size, round, and reactive to light [General Appearance - Well Developed] : well developed [Extraocular Movements] : extraocular movements were intact [No Oral Pallor] : no oral pallor [Normal Oral Mucosa] : normal oral mucosa [Neck Appearance] : the appearance of the neck was normal [Exaggerated Use Of Accessory Muscles For Inspiration] : no accessory muscle use [Respiration, Rhythm And Depth] : normal respiratory rhythm and effort [Heart Rate And Rhythm] : heart rate was normal and rhythm regular [Auscultation Breath Sounds / Voice Sounds] : lungs were clear to auscultation bilaterally [Heart Sounds Gallop] : no gallops [Heart Sounds Pericardial Friction Rub] : no pericardial rub [Abdomen Soft] : soft [Bowel Sounds] : normal bowel sounds [Abdomen Tenderness] : non-tender [Skin Color & Pigmentation] : normal skin color and pigmentation [Affect] : the affect was normal [] : no rash [Mood] : the mood was normal [FreeTextEntry1] : injected conjunctiva, Ectropron on L

## 2020-09-16 NOTE — REVIEW OF SYSTEMS
[Feeling Poorly] : feeling poorly [Feeling Tired] : feeling tired [Red Eyes] : red eyes [Incontinence] : incontinence [Joint Pain] : joint pain [Arthralgias] : arthralgias [Fever] : no fever [Chills] : no chills [Eye Pain] : no eye pain [Nosebleeds] : no nosebleeds [Sore Throat] : no sore throat [Nasal Discharge] : no nasal discharge [Heart Rate Is Fast] : the heart rate was not fast [Heart Rate Is Slow] : the heart rate was not slow [Hoarseness] : no hoarseness [Chest Pain] : no chest pain [Shortness Of Breath] : no shortness of breath [Palpitations] : no palpitations [Wheezing] : no wheezing [Cough] : no cough [Abdominal Pain] : no abdominal pain [Vomiting] : no vomiting [Vaginal Discharge] : no vaginal discharge [Abn Vaginal Bleeding] : no unexplained vaginal bleeding [Skin Lesions] : no skin lesions [Skin Wound] : no skin wound [Dizziness] : no dizziness [Fainting] : no fainting [Emotional Problems] : no emotional problems [Change In Personality] : no personality change

## 2020-09-16 NOTE — ASSESSMENT
[FreeTextEntry1] : trial oxybutynin x 1 month only\par likely stress reaction - hopefully temporary\par could make memory dry mouth constipation\par watch and return 4 weeks \par \par  L ectropion surgery deferred for now \par \par med list to be scanned\par

## 2020-10-05 ENCOUNTER — APPOINTMENT (OUTPATIENT)
Dept: GERIATRICS | Facility: CLINIC | Age: 85
End: 2020-10-05
Payer: MEDICARE

## 2020-10-05 VITALS
DIASTOLIC BLOOD PRESSURE: 78 MMHG | OXYGEN SATURATION: 98 % | RESPIRATION RATE: 20 BRPM | HEART RATE: 90 BPM | TEMPERATURE: 97.5 F | SYSTOLIC BLOOD PRESSURE: 120 MMHG

## 2020-10-05 PROCEDURE — 99214 OFFICE O/P EST MOD 30 MIN: CPT

## 2020-10-05 NOTE — HISTORY OF PRESENT ILLNESS
[FreeTextEntry1] : had two [facility] calls over the weekend due to incontinence\par Pt has had left side shoulder pain since she fell\par taking tylenol with some effect - also c/o of lbp\par using a thin scarf as a sling to support her arm\par does not understand how to put on the soft sling support from \par the hospital\par \par feels she may have a UTI due to discomfort on urination\par and increased frequency/incontinence\par does not want any aide service although she has trouble putting on \par the shoulder brace and feels "wobbly" in the shower\par \par Has been on oxybutin trial for about 2 weeks

## 2020-10-05 NOTE — REASON FOR VISIT
[Acute] : an acute visit [FreeTextEntry1] : concern about continued frequent urination and burning on urination

## 2020-10-21 ENCOUNTER — APPOINTMENT (OUTPATIENT)
Dept: GERIATRICS | Facility: CLINIC | Age: 85
End: 2020-10-21
Payer: MEDICARE

## 2020-10-21 VITALS — HEART RATE: 86 BPM | DIASTOLIC BLOOD PRESSURE: 80 MMHG | RESPIRATION RATE: 18 BRPM | SYSTOLIC BLOOD PRESSURE: 120 MMHG

## 2020-10-21 PROCEDURE — 99214 OFFICE O/P EST MOD 30 MIN: CPT

## 2020-10-21 RX ORDER — LORATADINE 5 MG/5 ML
10 SOLUTION, ORAL ORAL
Qty: 90 | Refills: 3 | Status: COMPLETED | COMMUNITY
Start: 2020-02-19 | End: 2020-10-21

## 2020-10-21 NOTE — HISTORY OF PRESENT ILLNESS
[Moderate] : Stage: Moderate [Stable] : Status: Stable [Memory Lapses Or Loss] : stable memory impairment [0] : 2) Feeling down, depressed, or hopeless: Not at all [PHQ-2 Score ___] : PHQ-2 Score [unfilled] [FreeTextEntry1] : s/p fall with clavicle fracture\par reviewed ortho note she stopped using the sling\par Dr. Morel has reccomended that she NOT swim at least for another 4 weeks\par swelling in both ankles worse since returning home\par \par on macrobid daily for UTI prophylaxis \par \par put off ectroprion surgery for now L eye\par \par

## 2020-10-21 NOTE — ASSESSMENT
[FreeTextEntry1] : very complicated case\par she is SO forgetful told me she did not have flu shot today\par but I saw the bandaid and then she realized she had had it after all\par goal is to prevent falls and hospitalization\par continue oxybutynin for now\par will dc claritin and increase lasix to 20mg daily x 30 days\par RTC 4 weeks\par prognosis guarded \par \par  L ectropion surgery deferred for now \par \par med list to be scanned\par

## 2020-11-12 ENCOUNTER — RX RENEWAL (OUTPATIENT)
Age: 85
End: 2020-11-12

## 2020-11-18 ENCOUNTER — APPOINTMENT (OUTPATIENT)
Dept: GERIATRICS | Facility: CLINIC | Age: 85
End: 2020-11-18
Payer: MEDICARE

## 2020-11-18 PROCEDURE — 99214 OFFICE O/P EST MOD 30 MIN: CPT

## 2020-11-19 VITALS — RESPIRATION RATE: 18 BRPM | SYSTOLIC BLOOD PRESSURE: 116 MMHG | HEART RATE: 80 BPM | DIASTOLIC BLOOD PRESSURE: 70 MMHG

## 2020-11-19 NOTE — REVIEW OF SYSTEMS
[Feeling Tired] : feeling tired [Red Eyes] : red eyes [Incontinence] : incontinence [Arthralgias] : arthralgias [Joint Pain] : joint pain [Fever] : no fever [Chills] : no chills [Feeling Poorly] : not feeling poorly [Eye Pain] : no eye pain [Nosebleeds] : no nosebleeds [Nasal Discharge] : no nasal discharge [Sore Throat] : no sore throat [Hoarseness] : no hoarseness [Heart Rate Is Slow] : the heart rate was not slow [Heart Rate Is Fast] : the heart rate was not fast [Chest Pain] : no chest pain [Palpitations] : no palpitations [Shortness Of Breath] : no shortness of breath [Wheezing] : no wheezing [Cough] : no cough [Abdominal Pain] : no abdominal pain [Vomiting] : no vomiting [Vaginal Discharge] : no vaginal discharge [Abn Vaginal Bleeding] : no unexplained vaginal bleeding [Skin Lesions] : no skin lesions [Skin Wound] : no skin wound [Dizziness] : no dizziness [Fainting] : no fainting [Change In Personality] : no personality change [Emotional Problems] : no emotional problems

## 2020-11-19 NOTE — HISTORY OF PRESENT ILLNESS
[Moderate] : Stage: Moderate [Stable] : Status: Stable [Memory Lapses Or Loss] : stable memory impairment [0] : 2) Feeling down, depressed, or hopeless: Not at all [PHQ-2 Score ___] : PHQ-2 Score [unfilled] [FreeTextEntry1] : s/p fall with clavicle fracture\par reviewed ortho note she stopped using the sling\par Dr. Lucas has recommended that she NOT swim at least for another 4 weeks\par she cannot remember if 6 week follow up was made\par \par on macrobid daily for UTI prophylaxis \par \par put off ectroprion surgery for now L eye\par \par

## 2020-11-19 NOTE — ASSESSMENT
[FreeTextEntry1] : will call son to confirm that ortho follow up has been made\par needs xray in about 2 weeks\par RTC 4 weeks \par \par continuie lasix for now\par RTC 4 weeks\par prognosis guarded \par \par  L ectropion surgery deferred for now \par med list to be scanned\par

## 2020-12-03 ENCOUNTER — RX RENEWAL (OUTPATIENT)
Age: 85
End: 2020-12-03

## 2020-12-04 ENCOUNTER — RX RENEWAL (OUTPATIENT)
Age: 85
End: 2020-12-04

## 2020-12-16 ENCOUNTER — RX RENEWAL (OUTPATIENT)
Age: 85
End: 2020-12-16

## 2021-01-13 ENCOUNTER — APPOINTMENT (OUTPATIENT)
Dept: GERIATRICS | Facility: CLINIC | Age: 86
End: 2021-01-13
Payer: MEDICARE

## 2021-01-13 VITALS — SYSTOLIC BLOOD PRESSURE: 110 MMHG | HEART RATE: 70 BPM | RESPIRATION RATE: 18 BRPM | DIASTOLIC BLOOD PRESSURE: 70 MMHG

## 2021-01-13 PROCEDURE — 99215 OFFICE O/P EST HI 40 MIN: CPT

## 2021-01-13 NOTE — HISTORY OF PRESENT ILLNESS
[0] : 2) Feeling down, depressed, or hopeless: Not at all [PHQ-2 Score ___] : PHQ-2 Score [unfilled] [FreeTextEntry1] : s/p fall with clavicle fracture\par reviewed ortho note she stopped using the sling\par Dr. Lucas has recommended that she NOT swim at least for another 4 weeks\par she cannot remember if 6 week follow up was made\par \par on macrobid daily for UTI prophylaxis \par \par put off ectroprion surgery for now L eye\par \par had been feeling more tired\par open to labs\par

## 2021-01-13 NOTE — ASSESSMENT
[FreeTextEntry1] : very complicated case\par she is SO forgetful \par cannot recall if she saw orthopedics for followup\par she is clear she wants to stay in [place] and not move to [place]\par fearful of her having a sentinel event\par children support her decision at this time\par \par if to be cleared for jennifer swimming will need a follow up appointment with ortho\par for clavicle fx\par will check labs and urine apex, next week\par \par \par  L ectropion surgery deferred for now \par \par

## 2021-01-20 ENCOUNTER — RX RENEWAL (OUTPATIENT)
Age: 86
End: 2021-01-20

## 2021-01-27 ENCOUNTER — APPOINTMENT (OUTPATIENT)
Dept: GERIATRICS | Facility: CLINIC | Age: 86
End: 2021-01-27
Payer: MEDICARE

## 2021-01-27 PROCEDURE — 99214 OFFICE O/P EST MOD 30 MIN: CPT

## 2021-01-29 VITALS
SYSTOLIC BLOOD PRESSURE: 138 MMHG | OXYGEN SATURATION: 95 % | DIASTOLIC BLOOD PRESSURE: 72 MMHG | TEMPERATURE: 97.6 F | HEART RATE: 90 BPM | RESPIRATION RATE: 20 BRPM

## 2021-01-29 NOTE — PHYSICAL EXAM
[General Appearance - Alert] : alert [General Appearance - In No Acute Distress] : in no acute distress [General Appearance - Well Nourished] : well nourished [General Appearance - Well Developed] : well developed [PERRL With Normal Accommodation] : pupils were equal in size, round, and reactive to light [Extraocular Movements] : extraocular movements were intact [Normal Oral Mucosa] : normal oral mucosa [No Oral Pallor] : no oral pallor [Neck Appearance] : the appearance of the neck was normal [Respiration, Rhythm And Depth] : normal respiratory rhythm and effort [Exaggerated Use Of Accessory Muscles For Inspiration] : no accessory muscle use [Heart Rate And Rhythm] : heart rate was normal and rhythm regular [Auscultation Breath Sounds / Voice Sounds] : lungs were clear to auscultation bilaterally [Heart Sounds Gallop] : no gallops [Heart Sounds Pericardial Friction Rub] : no pericardial rub [Bowel Sounds] : normal bowel sounds [Abdomen Soft] : soft [Abdomen Tenderness] : non-tender [Skin Color & Pigmentation] : normal skin color and pigmentation [] : no rash [Affect] : the affect was normal [Mood] : the mood was normal [FreeTextEntry1] : memory loss more apparent

## 2021-01-29 NOTE — HISTORY OF PRESENT ILLNESS
[FreeTextEntry1] : s/p fall with clavicle fracture\par Has not had follow up with Dr. Lucas \par feels well - no acute pain\par \par remains on macrobid daily for UTI prophylaxis \par \par put off ectroprion surgery for now L eye\par \par feeling more tired and having dry mouth\par urinary frequency does not seem as excessive\par recent labs done through [facility]\par \par \par  [0] : 2) Feeling down, depressed, or hopeless: Not at all [PHQ-2 Score ___] : PHQ-2 Score [unfilled]

## 2021-01-29 NOTE — ASSESSMENT
[FreeTextEntry1] : suspect overdiurseis\par will lwoer lasix to QOD x 4 weeks\par liekly will help with weakness and dry mouth\par \par watch for now\par \par  L ectropion surgery deferred for now \par med list to be scanned\par

## 2021-01-29 NOTE — REVIEW OF SYSTEMS
[Fever] : no fever [Chills] : no chills [Feeling Poorly] : not feeling poorly [Feeling Tired] : feeling tired [Eye Pain] : no eye pain [Red Eyes] : red eyes [Nosebleeds] : no nosebleeds [Nasal Discharge] : no nasal discharge [Sore Throat] : no sore throat [Hoarseness] : no hoarseness [Heart Rate Is Slow] : the heart rate was not slow [Heart Rate Is Fast] : the heart rate was not fast [Chest Pain] : no chest pain [Palpitations] : no palpitations [Shortness Of Breath] : no shortness of breath [Wheezing] : no wheezing [Cough] : no cough [Abdominal Pain] : no abdominal pain [Vomiting] : no vomiting [Incontinence] : incontinence [Vaginal Discharge] : no vaginal discharge [Abn Vaginal Bleeding] : no unexplained vaginal bleeding [Arthralgias] : arthralgias [Joint Pain] : joint pain [Skin Lesions] : no skin lesions [Skin Wound] : no skin wound [Dizziness] : no dizziness [Fainting] : no fainting [Change In Personality] : no personality change [Emotional Problems] : no emotional problems

## 2021-03-03 ENCOUNTER — APPOINTMENT (OUTPATIENT)
Dept: GERIATRICS | Facility: CLINIC | Age: 86
End: 2021-03-03
Payer: MEDICARE

## 2021-03-03 VITALS
RESPIRATION RATE: 18 BRPM | TEMPERATURE: 97.3 F | HEIGHT: 67 IN | DIASTOLIC BLOOD PRESSURE: 78 MMHG | SYSTOLIC BLOOD PRESSURE: 138 MMHG | HEART RATE: 78 BPM | OXYGEN SATURATION: 96 %

## 2021-03-03 DIAGNOSIS — S32.10XA UNSPECIFIED FRACTURE OF SACRUM, INITIAL ENCOUNTER FOR CLOSED FRACTURE: ICD-10-CM

## 2021-03-03 DIAGNOSIS — H04.123 DRY EYE SYNDROME OF BILATERAL LACRIMAL GLANDS: ICD-10-CM

## 2021-03-03 PROCEDURE — 99214 OFFICE O/P EST MOD 30 MIN: CPT

## 2021-03-03 RX ORDER — OXYBUTYNIN CHLORIDE 10 MG/1
10 TABLET, EXTENDED RELEASE ORAL
Qty: 30 | Refills: 11 | Status: COMPLETED | COMMUNITY
Start: 2020-11-12 | End: 2021-03-03

## 2021-03-03 NOTE — ASSESSMENT
[FreeTextEntry1] : doing better on lasix QOD\par will contnue at that dose\par cehck Q8 weeks to assure no LE edema worsenign or signs of overload\par standing tylenol BID to see if will help with pain\par check labs after next visit\par \par \par watch for now\par \par  L ectropion surgery deferred for now \par med list to be scanned\par

## 2021-03-03 NOTE — HISTORY OF PRESENT ILLNESS
[0] : 2) Feeling down, depressed, or hopeless: Not at all [PHQ-2 Score ___] : PHQ-2 Score [unfilled] [Moderate] : Stage: Moderate [Worse] : Status: Worse [Memory Lapses Or Loss] : worsened memory impairment [FreeTextEntry1] : s/p fall with clavicle fracture\par Has not had follow up with Dr. Lucas \par feels well - no acute pain\par \par remains on macrobid daily for UTI prophylaxis \par \par put off ectroprion surgery for now L eye\par \par now on lasix every other day\par mild LE edema but feels she is urinating less\par having more back pain\par using meloxicam daily\par but tylenol is PRN \par \par \par \par

## 2021-04-01 ENCOUNTER — APPOINTMENT (OUTPATIENT)
Dept: GERIATRICS | Facility: CLINIC | Age: 86
End: 2021-04-01

## 2021-04-07 ENCOUNTER — RX RENEWAL (OUTPATIENT)
Age: 86
End: 2021-04-07

## 2021-04-07 ENCOUNTER — APPOINTMENT (OUTPATIENT)
Dept: GERIATRICS | Facility: CLINIC | Age: 86
End: 2021-04-07
Payer: MEDICARE

## 2021-04-07 VITALS
RESPIRATION RATE: 18 BRPM | DIASTOLIC BLOOD PRESSURE: 60 MMHG | SYSTOLIC BLOOD PRESSURE: 110 MMHG | OXYGEN SATURATION: 97 % | TEMPERATURE: 97.2 F | HEART RATE: 90 BPM

## 2021-04-07 DIAGNOSIS — Z01.818 ENCOUNTER FOR OTHER PREPROCEDURAL EXAMINATION: ICD-10-CM

## 2021-04-07 DIAGNOSIS — R21 RASH AND OTHER NONSPECIFIC SKIN ERUPTION: ICD-10-CM

## 2021-04-07 DIAGNOSIS — Z87.440 PERSONAL HISTORY OF URINARY (TRACT) INFECTIONS: ICD-10-CM

## 2021-04-07 DIAGNOSIS — S42.002A FRACTURE OF UNSPECIFIED PART OF LEFT CLAVICLE, INITIAL ENCOUNTER FOR CLOSED FRACTURE: ICD-10-CM

## 2021-04-07 DIAGNOSIS — H11.31 CONJUNCTIVAL HEMORRHAGE, RIGHT EYE: ICD-10-CM

## 2021-04-07 DIAGNOSIS — Z87.898 PERSONAL HISTORY OF OTHER SPECIFIED CONDITIONS: ICD-10-CM

## 2021-04-07 DIAGNOSIS — Z85.828 PERSONAL HISTORY OF OTHER MALIGNANT NEOPLASM OF SKIN: ICD-10-CM

## 2021-04-07 PROCEDURE — 99214 OFFICE O/P EST MOD 30 MIN: CPT

## 2021-04-07 NOTE — HISTORY OF PRESENT ILLNESS
[FreeTextEntry1] : Son José here and would like pt to be seen as he is concerned about her memory\par wondering if there is a medicine to help, like aricept\par No change noted in patients mental status\par \par No fever, cough, SOB or significant fatigue, no urinary symptoms, no falls\par \par sleeping ok and appetite ok

## 2021-04-07 NOTE — ASSESSMENT
[FreeTextEntry1] : Discussed with son s/e of Aricept and/or namenda and limited \par time benefits as per research - can slow memory decline but not cure it\par and within 2 years catch up to those without med\par Also both can come with significant s/e radha GI.\par \par discussed success of prophylaxis Macrobid as pt has frequent\par UTIs which can effect her memory and mental status. \par currently she has no c/o of urinary changes or problems.\par \par Discussed mild crackles in lungs and mild edema - on lasix for this\par and followed closely. If worsens, then call NP or MD\par \par Will f/u with pcp at her next appt which is this month.

## 2021-04-07 NOTE — PHYSICAL EXAM
Problem: Ineffective Coping  Goal: Participates in unit activities  Interventions:  - Provide therapeutic environment   - Provide required programming   - Redirect inappropriate behaviors    Outcome: Progressing  Pt brighter in affect today and more engaged in group activities    He verbalized feeling "the most wonderful I've ever felt " [General Appearance - Alert] : alert [General Appearance - In No Acute Distress] : in no acute distress [General Appearance - Well Nourished] : well nourished [Sclera] : the sclera and conjunctiva were normal [] : no respiratory distress [Respiration, Rhythm And Depth] : normal respiratory rhythm and effort [Bibasilar Rales/Crackles] : bibasilar rales [Apical Impulse] : the apical impulse was normal [Heart Rate And Rhythm] : heart rate was normal and rhythm regular [Pitting Edema] : pitting edema present [___ +] : bilateral [unfilled]+ pitting edema to the ankles [Abnormal Walk] : normal gait [Skin Color & Pigmentation] : normal skin color and pigmentation [Skin Turgor] : normal skin turgor [FreeTextEntry1] : mild crackles both lower lobes R>L

## 2021-04-15 ENCOUNTER — RX RENEWAL (OUTPATIENT)
Age: 86
End: 2021-04-15

## 2021-04-28 ENCOUNTER — APPOINTMENT (OUTPATIENT)
Dept: GERIATRICS | Facility: CLINIC | Age: 86
End: 2021-04-28
Payer: MEDICARE

## 2021-04-28 PROCEDURE — 99215 OFFICE O/P EST HI 40 MIN: CPT

## 2021-04-29 VITALS — RESPIRATION RATE: 16 BRPM | HEART RATE: 80 BPM | SYSTOLIC BLOOD PRESSURE: 110 MMHG | DIASTOLIC BLOOD PRESSURE: 70 MMHG

## 2021-04-29 NOTE — HISTORY OF PRESENT ILLNESS
[FreeTextEntry1] : saw NP recently \par briefly discussed memory medications\par patrobert feels her memory is poor\par Son José agrees that short term memory appears particularily poor recently\par \par remains on macrobid daily for UTI prophylaxis \par more back pain\par on tylenol BID\par \par \par \par  [0] : 2) Feeling down, depressed, or hopeless: Not at all [PHQ-2 Score ___] : PHQ-2 Score [unfilled] [Moderate] : Stage: Moderate [Worse] : Status: Worse [Memory Lapses Or Loss] : worsened memory impairment

## 2021-04-29 NOTE — REVIEW OF SYSTEMS
[Fever] : no fever [Chills] : no chills [Feeling Poorly] : not feeling poorly [Feeling Tired] : feeling tired [Eye Pain] : no eye pain [Red Eyes] : red eyes [Nosebleeds] : no nosebleeds [Nasal Discharge] : no nasal discharge [Sore Throat] : no sore throat [Hoarseness] : no hoarseness [Heart Rate Is Slow] : the heart rate was not slow [Heart Rate Is Fast] : the heart rate was not fast [Chest Pain] : no chest pain [Palpitations] : no palpitations [Shortness Of Breath] : no shortness of breath [Wheezing] : no wheezing [Cough] : no cough [Abdominal Pain] : no abdominal pain [Vomiting] : no vomiting [Incontinence] : incontinence [Vaginal Discharge] : no vaginal discharge [Abn Vaginal Bleeding] : no unexplained vaginal bleeding [Arthralgias] : arthralgias [Skin Lesions] : no skin lesions [Joint Pain] : joint pain [Skin Wound] : no skin wound [Dizziness] : no dizziness [Fainting] : no fainting [Change In Personality] : no personality change [Emotional Problems] : no emotional problems

## 2021-04-29 NOTE — ASSESSMENT
[FreeTextEntry1] : extensive discussion with patient and son José\par I do not think aricept will be able to reverse any damange but\par if they want to trial we can start 5mg QHS watch for GI side effects\par if tolerated will increase to 10mg at next visit\par bibasilar crackles\par incease lasix to daily x 14 days then as prior every other day\par fluid could be adding to weight in legs and exaccernbating back pain\par continue tylenol standing BID for now\par \par remains on macribid daily which seems to be helping \par no recent UTI\par watch as lasix is being increased

## 2021-04-29 NOTE — PHYSICAL EXAM
[General Appearance - Alert] : alert [General Appearance - In No Acute Distress] : in no acute distress [General Appearance - Well Developed] : well developed [General Appearance - Well Nourished] : well nourished [PERRL With Normal Accommodation] : pupils were equal in size, round, and reactive to light [Extraocular Movements] : extraocular movements were intact [Normal Oral Mucosa] : normal oral mucosa [No Oral Pallor] : no oral pallor [Neck Appearance] : the appearance of the neck was normal [Exaggerated Use Of Accessory Muscles For Inspiration] : no accessory muscle use [Respiration, Rhythm And Depth] : normal respiratory rhythm and effort [Heart Rate And Rhythm] : heart rate was normal and rhythm regular [Heart Sounds Gallop] : no gallops [Heart Sounds Pericardial Friction Rub] : no pericardial rub [Bowel Sounds] : normal bowel sounds [Abdomen Soft] : soft [Abdomen Tenderness] : non-tender [Skin Color & Pigmentation] : normal skin color and pigmentation [] : no rash [Mood] : the mood was normal [Affect] : the affect was normal [FreeTextEntry1] : memory loss more apparent

## 2021-06-28 ENCOUNTER — APPOINTMENT (OUTPATIENT)
Dept: GERIATRICS | Facility: CLINIC | Age: 86
End: 2021-06-28

## 2021-06-28 ENCOUNTER — APPOINTMENT (OUTPATIENT)
Dept: GERIATRICS | Facility: CLINIC | Age: 86
End: 2021-06-28
Payer: MEDICARE

## 2021-06-28 VITALS
HEIGHT: 67 IN | OXYGEN SATURATION: 94 % | HEART RATE: 81 BPM | DIASTOLIC BLOOD PRESSURE: 60 MMHG | BODY MASS INDEX: 22.29 KG/M2 | SYSTOLIC BLOOD PRESSURE: 101 MMHG | TEMPERATURE: 97.9 F | WEIGHT: 142 LBS

## 2021-06-28 PROCEDURE — 99215 OFFICE O/P EST HI 40 MIN: CPT

## 2021-06-29 NOTE — REVIEW OF SYSTEMS
[Feeling Tired] : feeling tired [Red Eyes] : red eyes [Incontinence] : incontinence [Joint Pain] : joint pain [Arthralgias] : arthralgias [Fever] : no fever [Chills] : no chills [Feeling Poorly] : not feeling poorly [Eye Pain] : no eye pain [Nosebleeds] : no nosebleeds [Nasal Discharge] : no nasal discharge [Sore Throat] : no sore throat [Hoarseness] : no hoarseness [Heart Rate Is Slow] : the heart rate was not slow [Heart Rate Is Fast] : the heart rate was not fast [Chest Pain] : no chest pain [Palpitations] : no palpitations [Shortness Of Breath] : no shortness of breath [Wheezing] : no wheezing [Cough] : no cough [Abdominal Pain] : no abdominal pain [Vomiting] : no vomiting [Vaginal Discharge] : no vaginal discharge [Abn Vaginal Bleeding] : no unexplained vaginal bleeding [Skin Lesions] : no skin lesions [Skin Wound] : no skin wound [Dizziness] : no dizziness [Fainting] : no fainting [Change In Personality] : no personality change [Emotional Problems] : no emotional problems

## 2021-06-29 NOTE — PHYSICAL EXAM
[General Appearance - Alert] : alert [General Appearance - In No Acute Distress] : in no acute distress [General Appearance - Well Nourished] : well nourished [General Appearance - Well Developed] : well developed [Extraocular Movements] : extraocular movements were intact [PERRL With Normal Accommodation] : pupils were equal in size, round, and reactive to light [Normal Oral Mucosa] : normal oral mucosa [No Oral Pallor] : no oral pallor [Neck Appearance] : the appearance of the neck was normal [Respiration, Rhythm And Depth] : normal respiratory rhythm and effort [Exaggerated Use Of Accessory Muscles For Inspiration] : no accessory muscle use [Heart Rate And Rhythm] : heart rate was normal and rhythm regular [Heart Sounds Pericardial Friction Rub] : no pericardial rub [Heart Sounds Gallop] : no gallops [Bowel Sounds] : normal bowel sounds [Abdomen Soft] : soft [Abdomen Tenderness] : non-tender [Skin Color & Pigmentation] : normal skin color and pigmentation [] : no rash [Affect] : the affect was normal [Mood] : the mood was normal [FreeTextEntry1] : memory loss more apparent

## 2021-06-29 NOTE — HISTORY OF PRESENT ILLNESS
[No falls in past year] : Patient reported no falls in the past year [Fully functional (bathing, dressing, toileting, transferring, walking, feeding)] : Fully functional (bathing, dressing, toileting, transferring, walking, feeding) [Fully functional (using the telephone, shopping, preparing meals, housekeeping, doing laundry, using transportation,] : Fully functional and needs no help or supervision to perform IADLs (using the telephone, shopping, preparing meals, housekeeping, doing laundry, using transportation, managing medications and managing finances) [Walker] : walker [Canes] : chuy [Smoke Detector] : smoke detector [Carbon Monoxide Detector] : carbon monoxide detector [Safety elements used in home] : safety elements used in home [Grab Bars] : grab bars [Shower Chair] : shower chair [Night Light] : night light [Anti-Slip Measures] : anti-slip measures [0] : 2) Feeling down, depressed, or hopeless: Not at all [PHQ-2 Score ___] : PHQ-2 Score [unfilled] [FreeTextEntry1] : with daughter who flew from [place]\par plan is to vacation in [place] with family\par having more LE edema\par struggling with urinary incontinence\par started dementia medication\par also on antibiotic ppx for UTI no recent UTIs\par has been tested as she thought she had symptoms\par remains on Tylenol\par \par \par \par  [Driving] : not driving

## 2021-06-29 NOTE — ASSESSMENT
[FreeTextEntry1] : extensive discussion with patient and daughter\par plan is to continue to [place]\par will give 3 days of higher dose lasix (40mg) after traveling\par then resume prior 20mg\par when back will discuss trialing off of oxybutynin\par also can discuss increase dementia medicaitons\par \par remains on macribid daily which seems to be helping \par no recent UTI\par watch as lasix is being increased

## 2021-08-16 ENCOUNTER — RX RENEWAL (OUTPATIENT)
Age: 86
End: 2021-08-16

## 2021-08-19 ENCOUNTER — RESULT REVIEW (OUTPATIENT)
Age: 86
End: 2021-08-19

## 2021-08-25 ENCOUNTER — APPOINTMENT (OUTPATIENT)
Dept: GERIATRICS | Facility: CLINIC | Age: 86
End: 2021-08-25
Payer: MEDICARE

## 2021-08-25 VITALS
HEART RATE: 70 BPM | RESPIRATION RATE: 18 BRPM | SYSTOLIC BLOOD PRESSURE: 130 MMHG | OXYGEN SATURATION: 98 % | TEMPERATURE: 97.2 F | DIASTOLIC BLOOD PRESSURE: 72 MMHG

## 2021-08-25 PROCEDURE — 99214 OFFICE O/P EST MOD 30 MIN: CPT

## 2021-08-25 NOTE — ASSESSMENT
[FreeTextEntry1] : check labs\par increase lasix to 40mg daily x 7 days\par to see if diuresis will help\par suspect overload\par \par I do not think aricept will be able to reverse any damange but\par if they want to trial we can start 5mg QHS watch for GI side effects\par next visit can increase to 10mg final dose\par \par \par remains on macribid daily which seems to be helping \par

## 2021-08-25 NOTE — PHYSICAL EXAM
[General Appearance - Alert] : alert [General Appearance - In No Acute Distress] : in no acute distress [General Appearance - Well Developed] : well developed [General Appearance - Well Nourished] : well nourished [PERRL With Normal Accommodation] : pupils were equal in size, round, and reactive to light [Extraocular Movements] : extraocular movements were intact [Normal Oral Mucosa] : normal oral mucosa [Neck Appearance] : the appearance of the neck was normal [No Oral Pallor] : no oral pallor [Respiration, Rhythm And Depth] : normal respiratory rhythm and effort [Exaggerated Use Of Accessory Muscles For Inspiration] : no accessory muscle use [Heart Rate And Rhythm] : heart rate was normal and rhythm regular [Heart Sounds Pericardial Friction Rub] : no pericardial rub [Heart Sounds Gallop] : no gallops [Bowel Sounds] : normal bowel sounds [Abdomen Soft] : soft [Abdomen Tenderness] : non-tender [] : no rash [Skin Color & Pigmentation] : normal skin color and pigmentation [Affect] : the affect was normal [Mood] : the mood was normal [FreeTextEntry1] : memory loss more apparent

## 2021-08-25 NOTE — REVIEW OF SYSTEMS
[Fever] : no fever [Chills] : no chills [Feeling Poorly] : not feeling poorly [Feeling Tired] : feeling tired [Eye Pain] : no eye pain [Red Eyes] : red eyes [Nosebleeds] : no nosebleeds [Nasal Discharge] : no nasal discharge [Sore Throat] : no sore throat [Hoarseness] : no hoarseness [Heart Rate Is Slow] : the heart rate was not slow [Heart Rate Is Fast] : the heart rate was not fast [Chest Pain] : no chest pain [Palpitations] : no palpitations [Shortness Of Breath] : no shortness of breath [Wheezing] : no wheezing [Cough] : no cough [Abdominal Pain] : no abdominal pain [Vomiting] : no vomiting [Incontinence] : incontinence [Vaginal Discharge] : no vaginal discharge [Abn Vaginal Bleeding] : no unexplained vaginal bleeding [Arthralgias] : arthralgias [Skin Lesions] : no skin lesions [Skin Wound] : no skin wound [Dizziness] : no dizziness [Fainting] : no fainting [Change In Personality] : no personality change [Emotional Problems] : no emotional problems

## 2021-08-25 NOTE — HISTORY OF PRESENT ILLNESS
[FreeTextEntry1] : was in [place] for a family vacation\par since return feels more tired fatigued\par also feels memory is deteriorating quickly and she is urinating more frequently\par urine testing last week negative for UTI\par yocasta on call RN stated that her myrbetriq did run out while she was on vacation\par \par remains on macrobid daily for UTI prophylaxis \par back pain controlled on OTC medications  [Any fall with injury in past year] : Patient reported fall with injury in the past year [Completely Independent] : Completely independent. [Full assistance needed] : Assistance needed managing medications [] : Assistance needed managing finances. [Walker] : walker [0] : 2) Feeling down, depressed, or hopeless: Not at all (0) [PHQ-2 Negative - No further assessment needed] : PHQ-2 Negative - No further assessment needed [[id number]] : 0 [Moderate] : Stage: Moderate [Worse] : Status: Worse [Memory Lapses Or Loss] : worsened memory impairment

## 2021-09-01 ENCOUNTER — APPOINTMENT (OUTPATIENT)
Dept: GERIATRICS | Facility: CLINIC | Age: 86
End: 2021-09-01
Payer: MEDICARE

## 2021-09-01 VITALS
OXYGEN SATURATION: 98 % | TEMPERATURE: 97.5 F | SYSTOLIC BLOOD PRESSURE: 136 MMHG | RESPIRATION RATE: 18 BRPM | DIASTOLIC BLOOD PRESSURE: 72 MMHG | HEART RATE: 60 BPM

## 2021-09-01 PROCEDURE — 99214 OFFICE O/P EST MOD 30 MIN: CPT

## 2021-09-01 NOTE — HISTORY OF PRESENT ILLNESS
[One fall no injury in past year] : Patient reported one fall in the past year without injury [Completely Independent] : Completely independent. [Full assistance needed] : Assistance needed managing medications [] : Assistance needed managing finances. [Walker] : walker [0] : 2) Feeling down, depressed, or hopeless: Not at all (0) [PHQ-2 Negative - No further assessment needed] : PHQ-2 Negative - No further assessment needed [Moderate] : Stage: Moderate [Stable] : Status: Stable [Memory Lapses Or Loss] : stable memory impairment [FreeTextEntry1] : was in [place] for a family vacation\par since return feels more tired fatigued\par also feels memory is deteriorating quickly and she is urinating more frequently\par urine testing last week negative for UTI\par yocasta on call RN stated that her myrbetriq did run out while she was on vacation\par \par remains on macrobid daily for UTI prophylaxis \par back pain controlled on OTC medications \par \par update\par was seen last week\par having more urinary frequency on lasix 20mg daily\par lasix was not increased as planned\par \par labs show increasing TSH , BNP is normal\par Hb slightly dropping\par  [[id number]] : 0

## 2021-09-01 NOTE — REVIEW OF SYSTEMS
[Feeling Tired] : feeling tired [Red Eyes] : red eyes [Incontinence] : incontinence [Arthralgias] : arthralgias [Fever] : no fever [Chills] : no chills [Feeling Poorly] : not feeling poorly [Eye Pain] : no eye pain [Nosebleeds] : no nosebleeds [Nasal Discharge] : no nasal discharge [Sore Throat] : no sore throat [Hoarseness] : no hoarseness [Heart Rate Is Slow] : the heart rate was not slow [Heart Rate Is Fast] : the heart rate was not fast [Chest Pain] : no chest pain [Palpitations] : no palpitations [Shortness Of Breath] : no shortness of breath [Wheezing] : no wheezing [Cough] : no cough [Abdominal Pain] : no abdominal pain [Vomiting] : no vomiting [Vaginal Discharge] : no vaginal discharge [Abn Vaginal Bleeding] : no unexplained vaginal bleeding [Skin Lesions] : no skin lesions [Skin Wound] : no skin wound [Dizziness] : no dizziness [Fainting] : no fainting [Change In Personality] : no personality change [Emotional Problems] : no emotional problems

## 2021-09-01 NOTE — ASSESSMENT
[FreeTextEntry1] : labs showed increasing TSH\par increase levothyroxine to 100mcg daily\par will take time to kickin\par suspect this is why she is retaining more fluid\par will increase lasix to 40mg daily x 5 days only then resume prior dosing\par \par anemia\par likely related to melxicam and asa\par continue for now but once fluid status is fixed will address and likely doa  trial off of medication \par \par in future consider donepezil 10mg final dose\par remains on macribid daily which seems to be helping \par \par called son José \par he will confirm on asa and statin may not continue per patients wishes\par life prolonging drugs\par her pamela is to minimize meds \par

## 2021-09-01 NOTE — PHYSICAL EXAM
[General Appearance - Alert] : alert [General Appearance - In No Acute Distress] : in no acute distress [General Appearance - Well Nourished] : well nourished [General Appearance - Well Developed] : well developed [PERRL With Normal Accommodation] : pupils were equal in size, round, and reactive to light [Extraocular Movements] : extraocular movements were intact [Normal Oral Mucosa] : normal oral mucosa [No Oral Pallor] : no oral pallor [Neck Appearance] : the appearance of the neck was normal [Respiration, Rhythm And Depth] : normal respiratory rhythm and effort [Exaggerated Use Of Accessory Muscles For Inspiration] : no accessory muscle use [Heart Rate And Rhythm] : heart rate was normal and rhythm regular [Heart Sounds Gallop] : no gallops [Heart Sounds Pericardial Friction Rub] : no pericardial rub [Bowel Sounds] : normal bowel sounds [Abdomen Soft] : soft [Abdomen Tenderness] : non-tender [Skin Color & Pigmentation] : normal skin color and pigmentation [] : no rash [Affect] : the affect was normal [Mood] : the mood was normal [FreeTextEntry1] : memory loss more apparent

## 2021-10-13 ENCOUNTER — APPOINTMENT (OUTPATIENT)
Dept: GERIATRICS | Facility: CLINIC | Age: 86
End: 2021-10-13
Payer: MEDICARE

## 2021-10-13 VITALS
RESPIRATION RATE: 18 BRPM | DIASTOLIC BLOOD PRESSURE: 70 MMHG | OXYGEN SATURATION: 97 % | HEART RATE: 70 BPM | TEMPERATURE: 97.3 F | SYSTOLIC BLOOD PRESSURE: 130 MMHG

## 2021-10-13 PROCEDURE — 99215 OFFICE O/P EST HI 40 MIN: CPT

## 2021-10-13 NOTE — ASSESSMENT
[FreeTextEntry1] : spoke w daughter\par plan is to tour [place] on he rnext visit\par \par continue lasix dosing 20 mg daily for now\par lung exam greatly improved on todays visit\par \par trial higher dose of aricept 10mg daily\par \par labs next visit\par RTC 8 weeks \par \par remains on macribid daily which seems to be helping \par \par in future clarify w son Kate\par remains on asa and statin \par he will confirm on asa and statin may not continue per patients wishes\par life prolonging drugs\par her wish is to minimize meds \par

## 2021-10-13 NOTE — HISTORY OF PRESENT ILLNESS
[One fall no injury in past year] : Patient reported one fall in the past year without injury [Completely Independent] : Completely independent. [Full assistance needed] : Assistance needed managing medications [] : Assistance needed managing finances. [Walker] : walker [0] : 2) Feeling down, depressed, or hopeless: Not at all (0) [PHQ-2 Negative - No further assessment needed] : PHQ-2 Negative - No further assessment needed [Moderate] : Stage: Moderate [Memory Lapses Or Loss] : worsened memory impairment [FreeTextEntry1] : Presenting with daughter Ileana who is visiting from [place]\par She notes more memory loss in mom as well\par Discussed safety in apartment and possibility of AL\par Patient is hesitant and wanting to remain independent for as long as possible\par Breathing better on higher dose Lasix\par  [[id number]] : 0

## 2021-11-05 ENCOUNTER — RESULT REVIEW (OUTPATIENT)
Age: 86
End: 2021-11-05

## 2021-11-05 ENCOUNTER — APPOINTMENT (OUTPATIENT)
Dept: GERIATRICS | Facility: CLINIC | Age: 86
End: 2021-11-05
Payer: MEDICARE

## 2021-11-05 VITALS — HEART RATE: 70 BPM | TEMPERATURE: 97 F | RESPIRATION RATE: 20 BRPM

## 2021-11-05 PROCEDURE — 99213 OFFICE O/P EST LOW 20 MIN: CPT

## 2021-11-11 NOTE — HISTORY OF PRESENT ILLNESS
[FreeTextEntry1] : Feels she has a UTI with dysuria over the past several days..\par Pt has frequent UTIs and on prophylaxis macrobid once a day.\par Appetite has been nl, no unusual fatigue,\par No fever, no abdominal pain \par

## 2021-11-11 NOTE — ASSESSMENT
[FreeTextEntry1] : ordered u/a, c&S\par pt educated - continue prophylaxis Macrobid and\par we will wait for culture and \par to hydrate well until results are in\par \par

## 2021-11-11 NOTE — PHYSICAL EXAM
[Alert] : alert [No Acute Distress] : in no acute distress [Normal Outer Ear/Nose] : the ears and nose were normal in appearance [Normal] : the appearance was normal, neck was supple [No Respiratory Distress] : no respiratory distress [Heart Rate And Rhythm] : heart rate was normal and rhythm regular [Abdomen Tenderness] : non-tender [No Spinal Tenderness] : no spinal tenderness [Normal Color / Pigmentation] : normal skin color and pigmentation [Normal Affect] : the affect was normal [Normal Mood] : the mood was normal [de-identified] : ambulating slowly with walker

## 2021-12-08 ENCOUNTER — LABORATORY RESULT (OUTPATIENT)
Age: 86
End: 2021-12-08

## 2021-12-10 ENCOUNTER — RESULT REVIEW (OUTPATIENT)
Age: 86
End: 2021-12-10

## 2021-12-14 RX ORDER — NITROFURANTOIN (MONOHYDRATE/MACROCRYSTALS) 25; 75 MG/1; MG/1
100 CAPSULE ORAL
Qty: 14 | Refills: 0 | Status: COMPLETED | COMMUNITY
Start: 2021-12-14 | End: 2021-12-21

## 2021-12-15 ENCOUNTER — APPOINTMENT (OUTPATIENT)
Dept: GERIATRICS | Facility: CLINIC | Age: 86
End: 2021-12-15
Payer: MEDICARE

## 2021-12-15 VITALS
TEMPERATURE: 97.9 F | SYSTOLIC BLOOD PRESSURE: 128 MMHG | OXYGEN SATURATION: 95 % | DIASTOLIC BLOOD PRESSURE: 70 MMHG | RESPIRATION RATE: 20 BRPM | HEART RATE: 82 BPM

## 2021-12-15 DIAGNOSIS — M54.50 LOW BACK PAIN, UNSPECIFIED: ICD-10-CM

## 2021-12-15 PROCEDURE — 99214 OFFICE O/P EST MOD 30 MIN: CPT

## 2021-12-15 RX ORDER — AMOXICILLIN AND CLAVULANATE POTASSIUM 875; 125 MG/1; MG/1
875-125 TABLET, COATED ORAL
Qty: 14 | Refills: 0 | Status: COMPLETED | COMMUNITY
Start: 2021-11-09 | End: 2021-12-15

## 2021-12-15 NOTE — ASSESSMENT
[FreeTextEntry1] : left voicemail for brenda Love\par to confirm if urogynecology appt was made\par \par current UTI\par complete macrbid BID dosing for acute infection\par then continue daily ppx\par \par continue lasix 40mg daily for now\par lungs sound good \par \par for now\par repeat TFTs in 8 weeks TSH was low\par write for PT to help patient get more active \par \par in future clarify w son Kate\par remains on asa and statin \par he will confirm on asa and statin may not continue per patients wishes\par life prolonging drugs\par her wish is to minimize meds \par \par reviewed labs\par stable mostly \par TSH down trending repeat CBC and TFT in 8 months \par RTC 9 weeks

## 2021-12-15 NOTE — HISTORY OF PRESENT ILLNESS
[One fall no injury in past year] : Patient reported one fall in the past year without injury [Completely Independent] : Completely independent. [Full assistance needed] : Assistance needed managing medications [] : Assistance needed managing finances. [Walker] : walker [0] : 2) Feeling down, depressed, or hopeless: Not at all (0) [PHQ-2 Negative - No further assessment needed] : PHQ-2 Negative - No further assessment needed [Moderate] : Stage: Moderate [Memory Lapses Or Loss] : worsened memory impairment [FreeTextEntry1] : Memory loss is more apparent\par two UTI's since November\par about to start BID macrobid\par \par feels tired, more weak\par wanting to do PT\par \par was referred to urogynecology but unclear if appointment was made\par left  for son Kate  [[id number]] : 0

## 2021-12-17 ENCOUNTER — RX RENEWAL (OUTPATIENT)
Age: 86
End: 2021-12-17

## 2022-01-26 ENCOUNTER — LABORATORY RESULT (OUTPATIENT)
Age: 87
End: 2022-01-26

## 2022-02-03 ENCOUNTER — RX RENEWAL (OUTPATIENT)
Age: 87
End: 2022-02-03

## 2022-02-11 ENCOUNTER — APPOINTMENT (OUTPATIENT)
Dept: GERIATRICS | Facility: CLINIC | Age: 87
End: 2022-02-11
Payer: MEDICARE

## 2022-02-11 VITALS
DIASTOLIC BLOOD PRESSURE: 66 MMHG | HEART RATE: 64 BPM | RESPIRATION RATE: 18 BRPM | SYSTOLIC BLOOD PRESSURE: 128 MMHG | OXYGEN SATURATION: 97 % | TEMPERATURE: 97.2 F

## 2022-02-11 DIAGNOSIS — E55.9 VITAMIN D DEFICIENCY, UNSPECIFIED: ICD-10-CM

## 2022-02-11 PROCEDURE — 99214 OFFICE O/P EST MOD 30 MIN: CPT

## 2022-02-11 RX ORDER — ASPIRIN 81 MG
81 TABLET, DELAYED RELEASE (ENTERIC COATED) ORAL DAILY
Refills: 0 | Status: COMPLETED | COMMUNITY
End: 2022-02-11

## 2022-02-11 NOTE — HISTORY OF PRESENT ILLNESS
[One fall no injury in past year] : Patient reported one fall in the past year without injury [Completely Independent] : Completely independent. [Full assistance needed] : Assistance needed managing medications [] : Assistance needed managing finances. [Walker] : walker [0] : 2) Feeling down, depressed, or hopeless: Not at all (0) [PHQ-2 Negative - No further assessment needed] : PHQ-2 Negative - No further assessment needed [Moderate] : Stage: Moderate [Memory Lapses Or Loss] : worsened memory impairment [FreeTextEntry1] : spoke w son José\par since urogynecology lowered lasix 20mg to every other day bladder seems better controlled\par \par no difficulty breathing\par does have LE edema at baseline \par \par \par \par  [[id number]] : 0

## 2022-02-11 NOTE — ASSESSMENT
[FreeTextEntry1] : spoke w José\par continue lasix QOD\par stop asa x 8 weeks\par repeat CBC iron studies in 8 weeks see me in 9 weeks\par on macrobid for UTI ppx\par in future could consider stopping norvasc and increasing BB but HR at times low\par \par \par if more SOB or crackles on lungs increase lasix\par continue 20mg every other day at baseline\par \par still on meloxicam which can be causing anemia but more important for her quality of life \par

## 2022-03-07 ENCOUNTER — APPOINTMENT (OUTPATIENT)
Dept: GERIATRICS | Facility: CLINIC | Age: 87
End: 2022-03-07
Payer: MEDICARE

## 2022-03-07 VITALS
TEMPERATURE: 97.3 F | WEIGHT: 144 LBS | DIASTOLIC BLOOD PRESSURE: 84 MMHG | HEIGHT: 67 IN | SYSTOLIC BLOOD PRESSURE: 150 MMHG | BODY MASS INDEX: 22.6 KG/M2 | HEART RATE: 70 BPM | OXYGEN SATURATION: 97 % | RESPIRATION RATE: 20 BRPM

## 2022-03-07 DIAGNOSIS — Z87.898 PERSONAL HISTORY OF OTHER SPECIFIED CONDITIONS: ICD-10-CM

## 2022-03-07 DIAGNOSIS — R06.02 SHORTNESS OF BREATH: ICD-10-CM

## 2022-03-07 DIAGNOSIS — R09.89 OTHER SPECIFIED SYMPTOMS AND SIGNS INVOLVING THE CIRCULATORY AND RESPIRATORY SYSTEMS: ICD-10-CM

## 2022-03-07 DIAGNOSIS — M80.80XA OTHER OSTEOPOROSIS WITH CURRENT PATHOLOGICAL FRACTURE, UNSPECIFIED SITE, INITIAL ENCOUNTER FOR FRACTURE: ICD-10-CM

## 2022-03-07 DIAGNOSIS — R29.6 REPEATED FALLS: ICD-10-CM

## 2022-03-07 PROCEDURE — 99214 OFFICE O/P EST MOD 30 MIN: CPT

## 2022-03-08 PROBLEM — M80.80XA LOCALIZED OSTEOPOROSIS WITH CURRENT PATHOLOGICAL FRACTURE: Status: RESOLVED | Noted: 2019-10-18 | Resolved: 2022-03-08

## 2022-03-08 PROBLEM — R29.6 FREQUENT FALLS: Status: ACTIVE | Noted: 2019-09-11

## 2022-03-08 PROBLEM — R06.02 SOB (SHORTNESS OF BREATH) ON EXERTION: Status: ACTIVE | Noted: 2022-03-08

## 2022-03-08 NOTE — PHYSICAL EXAM
[Alert] : alert [No Acute Distress] : in no acute distress [Normal Outer Ear/Nose] : the ears and nose were normal in appearance [Normal Appearance] : the appearance of the neck was normal [Supple] : the neck was supple [No Respiratory Distress] : no respiratory distress [Respiration, Rhythm And Depth] : normal respiratory rhythm and effort [Decreased Breath Sounds] : decreased breath sounds [Heart Rate And Rhythm] : heart rate was normal and rhythm regular [Bowel Sounds] : normal bowel sounds [Abdomen Tenderness] : non-tender [Abdomen Soft] : soft [No Spinal Tenderness] : no spinal tenderness [Normal Gait] : normal gait [Normal Color / Pigmentation] : normal skin color and pigmentation [Normal Turgor] : normal skin turgor [No Focal Deficits] : no focal deficits [Normal Affect] : the affect was normal [Normal Mood] : the mood was normal [Sclera] : the sclera and conjunctiva were normal [PERRL] : pupils were equal in size, round, and reactive to light [Normal S1, S2] : normal S1 and S2 [___ +] : bilateral [unfilled]+ pretibial pitting edema [de-identified] : c/o fatigue, not herself [de-identified] : mild fine crackles right side - spo2 97% on room air, drops to 92% on short walk to front of office [de-identified] : right hand with good , slightly painful on palpation of hand (over bruising), no crepitus, no deformities of hand or wrist

## 2022-03-08 NOTE — ASSESSMENT
[FreeTextEntry1] : Pt is already in PT for strengthening. Unclear what is causing falls \par over the weekend\par spo2 drops to 92% on short walk\par L/S w crackles\par will restart daily lasix 20mg\par Educated pt this may increase urinary frequency -\par she agreed with same if it will help her "feel better."\par \par d/w pcp Dr. Reyes, in agreement\par \par

## 2022-03-08 NOTE — HISTORY OF PRESENT ILLNESS
[FreeTextEntry1] : did not go to ED\par no head strike\par Has a little pain in right hand\par full functionality of hand\par does not remember why she fell \par \par did have lasix reduced to TIW recently due to urinary frequency\par concerns

## 2022-03-24 ENCOUNTER — RESULT REVIEW (OUTPATIENT)
Age: 87
End: 2022-03-24

## 2022-03-25 RX ORDER — NITROFURANTOIN (MONOHYDRATE/MACROCRYSTALS) 25; 75 MG/1; MG/1
100 CAPSULE ORAL
Qty: 30 | Refills: 0 | Status: COMPLETED | COMMUNITY
Start: 2019-06-17 | End: 2022-03-30

## 2022-04-13 ENCOUNTER — LABORATORY RESULT (OUTPATIENT)
Age: 87
End: 2022-04-13

## 2022-04-14 ENCOUNTER — RX RENEWAL (OUTPATIENT)
Age: 87
End: 2022-04-14

## 2022-04-20 ENCOUNTER — APPOINTMENT (OUTPATIENT)
Dept: GERIATRICS | Facility: CLINIC | Age: 87
End: 2022-04-20
Payer: MEDICARE

## 2022-04-20 VITALS
SYSTOLIC BLOOD PRESSURE: 128 MMHG | RESPIRATION RATE: 16 BRPM | HEART RATE: 76 BPM | OXYGEN SATURATION: 97 % | TEMPERATURE: 97.7 F | DIASTOLIC BLOOD PRESSURE: 64 MMHG

## 2022-04-20 DIAGNOSIS — G89.4 CHRONIC PAIN SYNDROME: ICD-10-CM

## 2022-04-20 DIAGNOSIS — F32.A DEPRESSION, UNSPECIFIED: ICD-10-CM

## 2022-04-20 PROCEDURE — 99215 OFFICE O/P EST HI 40 MIN: CPT

## 2022-04-20 RX ORDER — AMOXICILLIN AND CLAVULANATE POTASSIUM 875; 125 MG/1; MG/1
875-125 TABLET, COATED ORAL
Qty: 14 | Refills: 0 | Status: COMPLETED | COMMUNITY
Start: 2022-03-28 | End: 2022-04-20

## 2022-04-20 NOTE — HISTORY OF PRESENT ILLNESS
[One fall no injury in past year] : Patient reported one fall in the past year without injury [Completely Independent] : Completely independent. [Full assistance needed] : Assistance needed managing medications [] : Assistance needed managing finances. [Walker] : walker [0] : 2) Feeling down, depressed, or hopeless: Not at all (0) [PHQ-2 Negative - No further assessment needed] : PHQ-2 Negative - No further assessment needed [Moderate] : Stage: Moderate [Memory Lapses Or Loss] : worsened memory impairment [FreeTextEntry1] : spoke w brenda Kumar\par anemia has not improved despite stopping ASA\par will now start iron\par \par \par \par \par \par  [[id number]] : 0

## 2022-04-20 NOTE — PHYSICAL EXAM
[General Appearance - Alert] : alert [General Appearance - In No Acute Distress] : in no acute distress [General Appearance - Well Nourished] : well nourished [General Appearance - Well Developed] : well developed [PERRL With Normal Accommodation] : pupils were equal in size, round, and reactive to light [Extraocular Movements] : extraocular movements were intact [Normal Oral Mucosa] : normal oral mucosa [No Oral Pallor] : no oral pallor [Neck Appearance] : the appearance of the neck was normal [Respiration, Rhythm And Depth] : normal respiratory rhythm and effort [Exaggerated Use Of Accessory Muscles For Inspiration] : no accessory muscle use [Heart Rate And Rhythm] : heart rate was normal and rhythm regular [Heart Sounds Gallop] : no gallops [Heart Sounds Pericardial Friction Rub] : no pericardial rub [Abdomen Soft] : soft [Bowel Sounds] : normal bowel sounds [Abdomen Tenderness] : non-tender [Skin Color & Pigmentation] : normal skin color and pigmentation [] : no rash [Affect] : the affect was normal [Mood] : the mood was normal [FreeTextEntry1] : memory loss more apparent

## 2022-04-20 NOTE — ASSESSMENT
[FreeTextEntry1] : spoke w José\par start oral iron daily\par check labs in 90 days\par if no improvement consider IV iron\par not wanting to stop mobic due to severe back pain\par \par \par \par if more SOB or crackles on lungs increase lasix\par continue 20mg every other day at baseline\par \par still on meloxicam which can be causing anemia but more important for her quality of life \par

## 2022-04-26 ENCOUNTER — RX RENEWAL (OUTPATIENT)
Age: 87
End: 2022-04-26

## 2022-07-27 ENCOUNTER — APPOINTMENT (OUTPATIENT)
Dept: GERIATRICS | Facility: CLINIC | Age: 87
End: 2022-07-27

## 2022-08-10 ENCOUNTER — LABORATORY RESULT (OUTPATIENT)
Age: 87
End: 2022-08-10

## 2022-08-16 ENCOUNTER — TRANSCRIPTION ENCOUNTER (OUTPATIENT)
Age: 87
End: 2022-08-16

## 2022-08-25 ENCOUNTER — APPOINTMENT (OUTPATIENT)
Dept: GERIATRICS | Facility: CLINIC | Age: 87
End: 2022-08-25

## 2022-08-25 PROCEDURE — 99442: CPT | Mod: 95

## 2022-08-25 NOTE — ASSESSMENT
[FreeTextEntry1] : spoke carla Kumar\par oral iron seems to be working\par continue same\par next visit can rediscuss medication list\par can rediscuss antibiotocs and even statin\par \par \par if more SOB or crackles on lungs increase lasix\par still on meloxicam which can be causing anemia but more important for her quality of life \par

## 2022-08-25 NOTE — REVIEW OF SYSTEMS
[Fever] : no fever [Chills] : no chills [Feeling Poorly] : not feeling poorly [Nosebleeds] : no nosebleeds [Nasal Discharge] : no nasal discharge [Chest Pain] : no chest pain [Palpitations] : no palpitations [Cough] : no cough [SOB on Exertion] : no shortness of breath during exertion [Incontinence] : incontinence [Confused] : confusion [Convulsions] : no convulsions [Dizziness] : no dizziness [Fainting] : no fainting

## 2022-08-25 NOTE — REASON FOR VISIT
[Home] : at home, [unfilled] , at the time of the visit. [Medical Office: ([facility])___] : at the medical office located in  [Verbal consent obtained from patient] : the patient, [unfilled] [FreeTextEntry3] : brenda Kumar [Follow-Up] : a follow-up visit [Family Member] : family member

## 2022-08-25 NOTE — HISTORY OF PRESENT ILLNESS
[FreeTextEntry1] : spoke w son José\par reviewed recent labs\par iron remains low but Hb is now normal\par will continue iron\par some questions re statin therapy and long term antibiotics usage\par overall had a good summer\par was at summer home\par spent time with son and daughter \par \par \par \par \par \par  [Any fall with injury in past year] : Patient reported fall with injury in the past year [Completely Independent] : Completely independent. [Completely Dependent] : Completely dependent. [Walker] : walker [Smoke Detector] : smoke detector [Wears Seat Belt] : wears seat belt [0] : 2) Feeling down, depressed, or hopeless: Not at all (0) [PHQ-2 Negative - No further assessment needed] : PHQ-2 Negative - No further assessment needed [[id number]] : 0 [Moderate] : Stage: Moderate [Stable] : Status: Stable

## 2022-09-16 ENCOUNTER — NON-APPOINTMENT (OUTPATIENT)
Age: 87
End: 2022-09-16

## 2022-09-16 ENCOUNTER — RESULT REVIEW (OUTPATIENT)
Age: 87
End: 2022-09-16

## 2022-09-16 ENCOUNTER — APPOINTMENT (OUTPATIENT)
Dept: GERIATRICS | Facility: CLINIC | Age: 87
End: 2022-09-16

## 2022-09-16 VITALS
OXYGEN SATURATION: 98 % | SYSTOLIC BLOOD PRESSURE: 136 MMHG | TEMPERATURE: 97.2 F | RESPIRATION RATE: 16 BRPM | DIASTOLIC BLOOD PRESSURE: 70 MMHG | HEART RATE: 77 BPM

## 2022-09-16 DIAGNOSIS — R39.15 URGENCY OF URINATION: ICD-10-CM

## 2022-09-16 DIAGNOSIS — S60.221A CONTUSION OF RIGHT HAND, INITIAL ENCOUNTER: ICD-10-CM

## 2022-09-16 PROCEDURE — 99213 OFFICE O/P EST LOW 20 MIN: CPT

## 2022-09-16 NOTE — ASSESSMENT
[FreeTextEntry1] : will check u/a c&s d/t pt with hx UTI\par currently on Macrobid prophylaxis\par Pt to take sample to [place] outpt lab and\par aware to increase fluids\par call / push PET if she has fever or other concerning\par symptoms\par

## 2022-09-16 NOTE — HISTORY OF PRESENT ILLNESS
[FreeTextEntry1] : Urinary symptoms began yesterday\par has been doing well since starting prophylactic \par a/b Macrobid 100mg daily with no reported symptoms

## 2022-09-21 ENCOUNTER — RX RENEWAL (OUTPATIENT)
Age: 87
End: 2022-09-21

## 2022-09-23 ENCOUNTER — NON-APPOINTMENT (OUTPATIENT)
Age: 87
End: 2022-09-23

## 2022-09-23 ENCOUNTER — APPOINTMENT (OUTPATIENT)
Dept: GERIATRICS | Facility: CLINIC | Age: 87
End: 2022-09-23

## 2022-09-23 VITALS
RESPIRATION RATE: 22 BRPM | TEMPERATURE: 98.2 F | DIASTOLIC BLOOD PRESSURE: 90 MMHG | OXYGEN SATURATION: 98 % | SYSTOLIC BLOOD PRESSURE: 130 MMHG | HEART RATE: 73 BPM

## 2022-09-23 DIAGNOSIS — R32 UNSPECIFIED URINARY INCONTINENCE: ICD-10-CM

## 2022-09-23 PROCEDURE — 99213 OFFICE O/P EST LOW 20 MIN: CPT

## 2022-09-26 PROBLEM — R32 URINARY INCONTINENCE: Status: ACTIVE | Noted: 2020-10-05

## 2022-09-26 NOTE — PHYSICAL EXAM
[Normal Oral Mucosa] : normal oral mucosa [Normal] : normal gait, no clubbing or cyanosis of the fingernails, muscle strength and tone were normal, no involuntary movements were seen [de-identified] : weak and forgetful

## 2022-09-26 NOTE — HISTORY OF PRESENT ILLNESS
[FreeTextEntry1] : Feeling chills, back pain, significant confusion, increased\par frequency and weakness - similar symptoms she had prior UTI\par and prior to a/b\par \par Pt took Augmentin for + UTI from 9/16/22-9/20/22\par with partial symptom relief which returned as of 9/22\par

## 2022-09-26 NOTE — ASSESSMENT
[FreeTextEntry1] : Pt with chronic recurring and mod/severe symptoms of UTI\par Will start alternate a/b to ensure full recovery and avoid\par hospitalization.\par

## 2022-09-28 ENCOUNTER — APPOINTMENT (OUTPATIENT)
Dept: GERIATRICS | Facility: CLINIC | Age: 87
End: 2022-09-28

## 2022-09-28 PROCEDURE — 99214 OFFICE O/P EST MOD 30 MIN: CPT

## 2022-10-05 ENCOUNTER — LABORATORY RESULT (OUTPATIENT)
Age: 87
End: 2022-10-05

## 2022-10-06 VITALS
RESPIRATION RATE: 20 BRPM | DIASTOLIC BLOOD PRESSURE: 80 MMHG | SYSTOLIC BLOOD PRESSURE: 136 MMHG | TEMPERATURE: 98 F | OXYGEN SATURATION: 98 % | HEART RATE: 70 BPM

## 2022-10-06 NOTE — PHYSICAL EXAM
[Normal Voice/Communication] : normal voice/communication [General Appearance - Alert] : alert [General Appearance - In No Acute Distress] : in no acute distress [General Appearance - Well Nourished] : well nourished [General Appearance - Well Developed] : well developed [PERRL With Normal Accommodation] : pupils were equal in size, round, and reactive to light [Extraocular Movements] : extraocular movements were intact [Normal Oral Mucosa] : normal oral mucosa [No Oral Pallor] : no oral pallor [Neck Appearance] : the appearance of the neck was normal [Respiration, Rhythm And Depth] : normal respiratory rhythm and effort [Exaggerated Use Of Accessory Muscles For Inspiration] : no accessory muscle use [Auscultation Breath Sounds / Voice Sounds] : lungs were clear to auscultation bilaterally [Heart Rate And Rhythm] : heart rate was normal and rhythm regular [Heart Sounds Gallop] : no gallops [Heart Sounds Pericardial Friction Rub] : no pericardial rub [Bowel Sounds] : normal bowel sounds [Abdomen Soft] : soft [Abdomen Tenderness] : non-tender [Skin Color & Pigmentation] : normal skin color and pigmentation [] : no rash [Affect] : the affect was normal [Mood] : the mood was normal [FreeTextEntry1] : memory loss more apparent

## 2022-10-06 NOTE — HISTORY OF PRESENT ILLNESS
[FreeTextEntry1] : spoke w son José\par recent UTI\par will take break before resuming ppx antibiotic\par weakness and fall possibly due to UTI\par discussed long term options ie assisted living vs [place]\par continue iron\par \par \par \par \par \par  [Any fall with injury in past year] : Patient reported fall with injury in the past year [Completely Independent] : Completely independent. [Completely Dependent] : Completely dependent. [Walker] : walker [Smoke Detector] : smoke detector [Wears Seat Belt] : wears seat belt [0] : 2) Feeling down, depressed, or hopeless: Not at all (0) [PHQ-2 Negative - No further assessment needed] : PHQ-2 Negative - No further assessment needed [[id number]] : 0 [Moderate] : Stage: Moderate [Stable] : Status: Stable

## 2022-10-06 NOTE — ASSESSMENT
[FreeTextEntry1] : spoke w José\par had UTI changed antibiotic as ppx was resistant to drug which grew out in Ucx\par advancing cognitive disease\par [place] needing [place]\par \par \par \par focus remains her quality of life\par children very involved in care\par son José\par daughter abroad in [place] \par

## 2022-10-13 ENCOUNTER — RESULT REVIEW (OUTPATIENT)
Age: 87
End: 2022-10-13

## 2022-10-27 ENCOUNTER — APPOINTMENT (OUTPATIENT)
Dept: GERIATRICS | Facility: CLINIC | Age: 87
End: 2022-10-27

## 2022-10-27 DIAGNOSIS — M81.0 AGE-RELATED OSTEOPOROSIS W/OUT CURRENT PATHOLOGICAL FRACTURE: ICD-10-CM

## 2022-10-27 PROCEDURE — 99442: CPT | Mod: 95

## 2022-11-01 ENCOUNTER — RX RENEWAL (OUTPATIENT)
Age: 87
End: 2022-11-01

## 2022-11-03 PROBLEM — M81.0 AGE RELATED OSTEOPOROSIS: Status: ACTIVE | Noted: 2020-02-19

## 2022-11-03 NOTE — REASON FOR VISIT
[Home] : at home, [unfilled] , at the time of the visit. [Medical Office: ([facility])___] : at the medical office located in  [Verbal consent obtained from patient] : the patient, [unfilled] [Acute] : an acute visit [Family Member] : family member

## 2022-11-03 NOTE — ASSESSMENT
[FreeTextEntry1] : spoke w José\par understands importance of aide\par in reality best location for patient is truly assisted living\par but given [place] children are trying their best to support her\par and will hire H[facility]\par we will continue to monitor to see if her needs are being met and that she remains safe\par as cognitive decline continues she will likely need additional help\par chronic UTIs continue\par \par if more SOB or crackles on lungs increase lasix\par still on meloxicam which can be causing anemia but more important for her quality of life \par

## 2022-11-03 NOTE — HISTORY OF PRESENT ILLNESS
[FreeTextEntry1] : spoke w son José\par discussed that patient has lost her ability to live independently\par there have been multiple instances where her safety is questionable\par now needing H[facility] 5 days a week\par will need to reevaluate as needed to see if needing more assistance\par \par more urinary incontinence\par more memory loss\par frequent UTIs\par \par \par \par \par \par \par  [Any fall with injury in past year] : Patient reported fall with injury in the past year [Completely Independent] : Completely independent. [Full assistance needed] : Assistance needed managing medications [] : Assistance needed managing finances. [Walker] : walker [Smoke Detector] : smoke detector [0] : 2) Feeling down, depressed, or hopeless: Not at all (0) [PHQ-2 Negative - No further assessment needed] : PHQ-2 Negative - No further assessment needed [[id number]] : 0

## 2022-11-23 ENCOUNTER — APPOINTMENT (OUTPATIENT)
Dept: GERIATRICS | Facility: CLINIC | Age: 87
End: 2022-11-23

## 2022-11-23 DIAGNOSIS — N32.81 OVERACTIVE BLADDER: ICD-10-CM

## 2022-11-23 PROCEDURE — 99215 OFFICE O/P EST HI 40 MIN: CPT

## 2022-11-25 VITALS
TEMPERATURE: 98 F | DIASTOLIC BLOOD PRESSURE: 70 MMHG | SYSTOLIC BLOOD PRESSURE: 120 MMHG | OXYGEN SATURATION: 99 % | RESPIRATION RATE: 18 BRPM | HEART RATE: 70 BPM

## 2022-11-25 PROBLEM — N32.81 OAB (OVERACTIVE BLADDER): Status: ACTIVE | Noted: 2018-11-16

## 2022-11-25 RX ORDER — LEVOFLOXACIN 500 MG/1
500 TABLET, FILM COATED ORAL
Qty: 5 | Refills: 0 | Status: COMPLETED | COMMUNITY
Start: 2022-09-23 | End: 2022-11-25

## 2022-11-25 RX ORDER — LEVOTHYROXINE SODIUM 100 UG/1
100 TABLET ORAL
Qty: 90 | Refills: 3 | Status: COMPLETED | COMMUNITY
Start: 2018-12-31 | End: 2022-11-25

## 2022-11-25 RX ORDER — NITROFURANTOIN MACROCRYSTALS 100 MG/1
100 CAPSULE ORAL
Qty: 90 | Refills: 3 | Status: COMPLETED | COMMUNITY
Start: 2020-06-03 | End: 2022-11-25

## 2022-11-25 RX ORDER — AMOXICILLIN AND CLAVULANATE POTASSIUM 875; 125 MG/1; MG/1
875-125 TABLET, COATED ORAL
Qty: 10 | Refills: 0 | Status: COMPLETED | COMMUNITY
Start: 2022-09-16 | End: 2022-11-25

## 2022-11-25 RX ORDER — DONEPEZIL HYDROCHLORIDE 10 MG/1
10 TABLET ORAL
Qty: 90 | Refills: 3 | Status: COMPLETED | COMMUNITY
Start: 2021-04-28 | End: 2022-11-25

## 2022-11-25 NOTE — ASSESSMENT
[FreeTextEntry1] : extensive discussion with patient and daugther ria\par she will speak with her brother low\par plan will be to dicsuss stopping medications\par \par discussed that stopping donepzil can somewhat accelerate cognitive decline\par might warrant a move to a unit with more assistance\par goal is to minimize meds\par \par \par \par \par still on meloxicam which can be causing anemia but more important for her quality of life \par

## 2022-11-25 NOTE — PHYSICAL EXAM
[Normal Voice/Communication] : normal voice/communication [General Appearance - Alert] : alert [General Appearance - In No Acute Distress] : in no acute distress [General Appearance - Well Nourished] : well nourished [General Appearance - Well Developed] : well developed [PERRL With Normal Accommodation] : pupils were equal in size, round, and reactive to light [Extraocular Movements] : extraocular movements were intact [Normal Oral Mucosa] : normal oral mucosa [No Oral Pallor] : no oral pallor [Neck Appearance] : the appearance of the neck was normal [Respiration, Rhythm And Depth] : normal respiratory rhythm and effort [Exaggerated Use Of Accessory Muscles For Inspiration] : no accessory muscle use [Heart Rate And Rhythm] : heart rate was normal and rhythm regular [Heart Sounds Gallop] : no gallops [Heart Sounds Pericardial Friction Rub] : no pericardial rub [Bowel Sounds] : normal bowel sounds [Abdomen Soft] : soft [Abdomen Tenderness] : non-tender [Skin Color & Pigmentation] : normal skin color and pigmentation [] : no rash [Affect] : the affect was normal [Mood] : the mood was normal [FreeTextEntry1] : memory loss more apparent

## 2022-11-25 NOTE — REVIEW OF SYSTEMS
[Incontinence] : incontinence [Confused] : confusion [Fever] : no fever [Chills] : no chills [Feeling Poorly] : not feeling poorly [Nosebleeds] : no nosebleeds [Nasal Discharge] : no nasal discharge [Chest Pain] : no chest pain [Palpitations] : no palpitations [Cough] : no cough [SOB on Exertion] : no shortness of breath during exertion [Convulsions] : no convulsions [Dizziness] : no dizziness [Fainting] : no fainting

## 2022-11-25 NOTE — ADDENDUM
[FreeTextEntry1] : spoke w ria\par she and brother are in agreement\par \par will stop lipitor donepezeil and macrobid\par \par if more frequent UTI will resume macrobid\par in future cut down on one of OAB meds\par

## 2022-11-25 NOTE — HISTORY OF PRESENT ILLNESS
[Any fall with injury in past year] : Patient reported fall with injury in the past year [Completely Independent] : Completely independent. [Completely Dependent] : Completely dependent. [Walker] : walker [Smoke Detector] : smoke detector [Wears Seat Belt] : wears seat belt [0] : 2) Feeling down, depressed, or hopeless: Not at all (0) [PHQ-2 Negative - No further assessment needed] : PHQ-2 Negative - No further assessment needed [Moderate] : Stage: Moderate [Stable] : Status: Stable [FreeTextEntry1] : presenting with daughter Ileana\par Recent hospitalization s/p fall\par multiple falls and issues with memory \par worsening cognitive decline\par children are trying to honors moms wishes to stay in independent living\par but she is showing more and more signs of decline\par understand that with a sentinel event she would be forced to move to another level of assistance\par Ileana wants to discuss medications \par Discussed statin and donepezil and even role of antibiotic\par \par goal is to minimize medications given advancing dementia \par \par \par \par \par \par  [[id number]] : 0

## 2022-11-28 ENCOUNTER — APPOINTMENT (OUTPATIENT)
Dept: GERIATRICS | Facility: CLINIC | Age: 87
End: 2022-11-28

## 2022-11-28 VITALS
DIASTOLIC BLOOD PRESSURE: 70 MMHG | HEART RATE: 62 BPM | TEMPERATURE: 97.7 F | SYSTOLIC BLOOD PRESSURE: 100 MMHG | RESPIRATION RATE: 20 BRPM

## 2022-11-28 DIAGNOSIS — R39.9 UNSPECIFIED SYMPTOMS AND SIGNS INVOLVING THE GENITOURINARY SYSTEM: ICD-10-CM

## 2022-11-28 DIAGNOSIS — N39.0 URINARY TRACT INFECTION, SITE NOT SPECIFIED: ICD-10-CM

## 2022-11-28 PROCEDURE — 99213 OFFICE O/P EST LOW 20 MIN: CPT

## 2022-11-28 NOTE — REASON FOR VISIT
[Acute] : an acute visit [Family Member] : family member [FreeTextEntry1] : "ulcer on toe" [FreeTextEntry3] : syed villar

## 2022-11-28 NOTE — PHYSICAL EXAM
[Sclera] : the sclera and conjunctiva were normal [Normal] : no respiratory distress, no accessory muscle use, normal respiratory rhythm and effort, lungs were clear to auscultation bilaterally [de-identified] : right 3rd toe at tip with wound 0.5x0.5x0.5, fully granulating, scant sang drainage, surrounding tissue is callous. Right 2nd toe with similary callous which is somewhat loose

## 2022-11-28 NOTE — ASSESSMENT
[FreeTextEntry1] : cleanse with ns, apply bacitracin\par cover with dpd\par will need packing and likely visitng nurse\par will refer to Dr. Gandara for further evaluation\par and treatment\par \par dtr can do this but is leaving for [place] tomorrow\par gave her a few supplies\par \par I will see pt on 11/30 and she has appt with dr. Gandara for 12/1\par \par updated pcp Dr. FUCHS

## 2022-11-28 NOTE — HISTORY OF PRESENT ILLNESS
[FreeTextEntry1] : pt was getting a manicure and\par a wound on her toe was discovered\par \par no pain, no difficulty walking

## 2022-11-30 ENCOUNTER — APPOINTMENT (OUTPATIENT)
Dept: GERIATRICS | Facility: CLINIC | Age: 87
End: 2022-11-30

## 2022-11-30 ENCOUNTER — RESULT REVIEW (OUTPATIENT)
Age: 87
End: 2022-11-30

## 2022-11-30 VITALS — RESPIRATION RATE: 20 BRPM | HEART RATE: 70 BPM | TEMPERATURE: 97.5 F

## 2022-11-30 PROCEDURE — 99213 OFFICE O/P EST LOW 20 MIN: CPT

## 2022-11-30 NOTE — HISTORY OF PRESENT ILLNESS
[FreeTextEntry1] : f/u for wound right third toe\par pt unable to do wound care and family not present/lives alone\par also has not done xray as recommended by podiatrist\par who will see her tomorrow\par \par also has started with cough x 1 day

## 2022-11-30 NOTE — ASSESSMENT
[FreeTextEntry1] : right 3rd toe wound: appled mupirocin, covered with dpd\par covid test done in office - negative\par \par coordinated with aide service to get pt to hospital for xray

## 2022-11-30 NOTE — PHYSICAL EXAM
[Sclera] : the sclera and conjunctiva were normal [Normal] : no respiratory distress, no accessory muscle use, normal respiratory rhythm and effort, lungs were clear to auscultation bilaterally [de-identified] : right 3rd toe at tip with wound 0.5x0.5x0.5, fully granulating, scant sang drainage, surrounding tissue is callous.toe slightly swollen and more painful today

## 2022-12-05 ENCOUNTER — APPOINTMENT (OUTPATIENT)
Dept: GERIATRICS | Facility: CLINIC | Age: 87
End: 2022-12-05

## 2022-12-05 VITALS
OXYGEN SATURATION: 99 % | RESPIRATION RATE: 20 BRPM | HEART RATE: 63 BPM | TEMPERATURE: 97.2 F | SYSTOLIC BLOOD PRESSURE: 110 MMHG | DIASTOLIC BLOOD PRESSURE: 70 MMHG

## 2022-12-05 DIAGNOSIS — J31.0 CHRONIC RHINITIS: ICD-10-CM

## 2022-12-05 DIAGNOSIS — J06.9 ACUTE UPPER RESPIRATORY INFECTION, UNSPECIFIED: ICD-10-CM

## 2022-12-05 PROCEDURE — 99213 OFFICE O/P EST LOW 20 MIN: CPT

## 2022-12-05 RX ORDER — GUAIFENESIN 600 MG/1
600 TABLET, EXTENDED RELEASE ORAL
Qty: 14 | Refills: 1 | Status: COMPLETED | COMMUNITY
Start: 2022-12-05 | End: 2022-12-19

## 2022-12-05 NOTE — REVIEW OF SYSTEMS
[As Noted in HPI] : as noted in HPI [Nasal Discharge] : nasal discharge [Cough] : cough [Negative] : Eyes

## 2022-12-05 NOTE — REASON FOR VISIT
[Follow-Up] : a follow-up visit [Formal Caregiver] : formal caregiver [FreeTextEntry1] : cough [FreeTextEntry3] : dale,

## 2022-12-05 NOTE — PHYSICAL EXAM
[Sclera] : the sclera and conjunctiva were normal [Normal Oral Mucosa] : normal oral mucosa [Normal] : no respiratory distress, no accessory muscle use, normal respiratory rhythm and effort, lungs were clear to auscultation bilaterally [Alert] : alert [No Acute Distress] : in no acute distress [de-identified] : c [de-identified] : PND in oropharynx, nares/septum swollen [de-identified] : right 3rd toe at tip with wound improving, scant sang drainage, surrounding tissue is callous.

## 2023-01-19 ENCOUNTER — RX RENEWAL (OUTPATIENT)
Age: 88
End: 2023-01-19

## 2023-02-09 ENCOUNTER — RX RENEWAL (OUTPATIENT)
Age: 88
End: 2023-02-09

## 2023-02-22 ENCOUNTER — APPOINTMENT (OUTPATIENT)
Dept: GERIATRICS | Facility: CLINIC | Age: 88
End: 2023-02-22
Payer: MEDICARE

## 2023-02-22 VITALS
DIASTOLIC BLOOD PRESSURE: 68 MMHG | HEART RATE: 76 BPM | OXYGEN SATURATION: 95 % | RESPIRATION RATE: 18 BRPM | TEMPERATURE: 97.2 F | SYSTOLIC BLOOD PRESSURE: 132 MMHG

## 2023-02-22 DIAGNOSIS — E03.9 HYPOTHYROIDISM, UNSPECIFIED: ICD-10-CM

## 2023-02-22 DIAGNOSIS — Z91.81 HISTORY OF FALLING: ICD-10-CM

## 2023-02-22 PROCEDURE — 99214 OFFICE O/P EST MOD 30 MIN: CPT

## 2023-02-22 NOTE — HISTORY OF PRESENT ILLNESS
[FreeTextEntry1] : presenting with son José\par now with HHA seeming more stable\par no recent UTI\par no recent falls\par Daughter Ileana just had a visit from [place]\par worsening cognitive decline\par children are trying to honors moms wishes to stay in independent living\par but she is showing more and more signs of decline\par understand that with a sentinel event she would be forced to move to another level of assistance\par \par goal is to minimize medications given advancing dementia \par \par \par \par \par \par  [Any fall with injury in past year] : Patient reported fall with injury in the past year [Completely Independent] : Completely independent. [Completely Dependent] : Completely dependent. [Walker] : walker [Smoke Detector] : smoke detector [Wears Seat Belt] : wears seat belt [0] : 2) Feeling down, depressed, or hopeless: Not at all (0) [PHQ-2 Negative - No further assessment needed] : PHQ-2 Negative - No further assessment needed [[id number]] : 0 [Moderate] : Stage: Moderate [Stable] : Status: Stable

## 2023-02-22 NOTE — ASSESSMENT
[FreeTextEntry1] : continue all meds\par seems more stable with more help in the home - HHA\par goal is to avoid UTI/fall/treat back pain\par more palliative or symptom management\par next visit will need labs just prior\par \par \par \par \par still on meloxicam which can be causing anemia but more important for her quality of life \par

## 2023-03-10 ENCOUNTER — RX RENEWAL (OUTPATIENT)
Age: 88
End: 2023-03-10

## 2023-03-17 NOTE — REVIEW OF SYSTEMS
[facility] HEART CARE   [address], [place] [address]   [email or web address]   OFFICE: [phone]          Thank you Maggie De La Cruz for asking the [facility] Heart Care team to participate in the care of your patient, Janes Montero.     Impression and Plan     1.? Coronary artery disease. Janes has known coronary artery disease having had prior coronary artery bypass graft surgery with JEOVANY graft to the LAD.? She underwent coronary angiography 17 August 2022 that revealed complete occlusion of the proximal native LAD though the JEOVANY graft to the LAD was widely patent.? Otherwise mild-moderate disease only.?   ??   This is stable.? Patient denies any chest pain or other concerning symptoms in this regard.?   ??   2.? Mitral insufficiency.? This was felt mild-moderate in degree on echocardiogram 16 August 2022.?  As noted below, Janes does report some progressive shortness of breath/dyspnea since my last visit with her.  Significant progression of valvular disease not strongly suspected though feel it would be reasonable and prudent to reassess with echocardiography.  Plan:    Echocardiogram to reassess for any progressive valvular heart disease or other structural heart disease that may be contributing to her subjective dyspnea.  ?   3.? Aortic stenosis.? This was felt mild in degree on echocardiogram 16 August 2022.?   ??   4.? Aortic insufficiency.? This was felt mild in degree on echocardiogram 16 August 2022.?   ??   5.? Hypertension.? Blood pressure is fairly reasonable in the office today at 134/78 mmHg.  She has been having very good readings at home.   ??   6.? Dyslipidemia.? Lipid profile 18 August 2022 revealed LDL 58 mg/dL and HDL 29 mg/dL.?     Continue atorvastatin.?     Follow-up and further recommendations pending echocardiographic findings.    35 minutes spent reviewing prior records (including documentation, laboratory studies, cardiac  testing/imaging), interview with patient along with physical exam, planning, and subsequent documentation/crafting of note).           History of Present Illness    Once again I would like to thank you again for asking me to participate in the care of your patient, Janes Montero.  As you know, but to reiterate for my own records, Janes Montero is a 89 year old female with known coronary artery disease having had prior coronary artery bypass graft surgery with JEOVANY graft to the LAD.? She underwent coronary angiography 17 August 2022 that revealed complete occlusion of the proximal native LAD though the JEOVANY graft to the LAD was widely patent.? Otherwise mild-moderate disease only.?   ??   On interview, she denies any chest pain symptoms.  She does,, report dyspnea on exertion which seems to have progressed since my last visit with her.  She denies shortness of breath at night to suggest PND/orthopnea, however.    Further review of systems is otherwise negative/noncontributory (medical record and 13 point review of systems reviewed as well and pertinent positives noted).         Cardiac Diagnostics      Echocardiogram 16 August 2022:?   1. Normal left ventricular size with mildly reduced left ventricular systolic performance.? Ejection fraction 45-50%.?   2. There is hypokinesis of the apex and mid to apical septal segments.?   3. Mild aortic stenosis.?   4. Mild aortic insufficiency.?   5. Mild-moderate mitral insufficiency.?   6. Right ventricle not well visualized.?   7. Mild left atrial enlargement.? Right atrium of normal dimension.?   ??   Echocardiogram 5 March 2020:?   1. Left ventricle: The cavity size is normal. Wall thickness is mildly increased. Systolic function is normal. The estimated ejection fraction is 55-60%.??   2. Left atrium: The atrium is mildly to moderately dilated.??   3. Mitral valve: Moderately calcified annulus. Leaflet separation is normal. There is no evidence for stenosis. Moderate  regurgitation.??   4. Aortic valve: Probably trileaflet; mildly calcified leaflets. Thickening, consistent with sclerosis. Cusp separation is normal. There is no stenosis. Mild to moderate regurgitation.??   5. Tricuspid valve: Structurally normal valve. Leaflet separation is normal. There is no evidence for stenosis. Moderate regurgitation.??   ??   Nuclear perfusion imaging study 16 August 2022:?   1. Nuclear stress test is abnormal.? There is a large area of transmural infarction involving the apex and distal inferolateral wall extending into the inferior wall.? There is a medium size area of stella-infarct ischemia involving the mid to basal inferior and inferolateral wall.?   2. The left ventricular ejection fraction at stress is 67% with severe hypokinesis of the distal inferior and apical walls.?   ?   Coronary angiography 17 August 2022:?   1. Left anterior descending coronary artery: Proximal 100% stenosis.?   2. Circumflex coronary artery: First obtuse marginal with 50% stenosis.?   3. Right coronary artery: 25% mid vessel stenosis.?   4. JEOVANY graft to the LAD: Widely patent.?   ?   Coronary angiogram 23 May 2011:?   1. Normal left main.??   2. Severe eccentric 95% proximal left anterior descending stenosis just after the first diagonal and just after the first septal  branches originate. Second eccentric stenosis of 60-70% is present proximally involving the origin of the second diagonal branch. The second diagonal branch ostium has a 50% stenosis.??   3. Mild irregularity of the circumflex, circumflex marginal branches.??   4. Mild irregularity of the right coronary artery. No significant stenosis. PDA arises distally, appears normal.??   5. Overall left ventricular ejection fraction normal.??   ??   Twelve-lead ECG (personally reviewed) 11 June 2018: Sinus rhythm with occasional PVCs.? Left bundle branch block.?          Physical Examination       /78 (BP Location: Left arm, Patient  "Position: Sitting, Cuff Size: Adult Large)   Pulse 78   Resp 18   Ht 1.651 m (5' 5\")   Wt 83.6 kg (184 lb 4.8 oz)   SpO2 90%   BMI 30.67 kg/m          Wt Readings from Last 3 Encounters:   03/16/23 83.6 kg (184 lb 4.8 oz)   12/02/22 82.6 kg (182 lb)   11/28/22 83.5 kg (184 lb)       The patient is alert and oriented times three. Sclerae are anicteric. Mucosal membranes are moist. Jugular venous pressure is normal. No significant adenopathy/thyromegally appreciated. Lungs are diminished bilaterally, but clear of significant crackles. On cardiovascular exam, the patient has a regular S1 and S2.  Heart sounds are distant.  Soft systolic murmur.  Abdomen is soft and non-tender. Extremities reveal no clubbing, cyanosis, or edema.         Medications  Allergies   Current Outpatient Medications   Medication Sig Dispense Refill     ascorbic acid, vitamin C, (ASCORBIC ACID WITH ELLIOTT HIPS) 500 MG tablet [ASCORBIC ACID, VITAMIN C, (ASCORBIC ACID WITH ELLIOTT HIPS) 500 MG TABLET] Take 500 mg by mouth daily.       aspirin 81 mg chewable tablet [ASPIRIN 81 MG CHEWABLE TABLET] Chew 81 mg at bedtime.       atorvastatin (LIPITOR) 20 MG tablet TAKE 1 TABLET BY MOUTH AT  BEDTIME 90 tablet 3     carbidopa-levodopa (SINEMET)  MG tablet TAKE 1 TABLET BY MOUTH 3  TIMES DAILY TAKE AT LEAST  1/2 HOUR BEFORE MEALS OR 2  HOURS AFTER MEALS DO NOT  MIX WITH FOOD 270 tablet 3     carvedilol (COREG) 12.5 MG tablet TAKE 1 TABLET BY MOUTH  TWICE DAILY WITH MEALS 180 tablet 3     cholecalciferol, vitamin D3, 1,000 unit tablet [CHOLECALCIFEROL, VITAMIN D3, 1,000 UNIT TABLET] Take 2,000 Units by mouth daily.       coenzyme Q10 (CO Q-10) 100 mg capsule [COENZYME Q10 (CO Q-10) 100 MG CAPSULE] Take 100 mg by mouth 2 (two) times a day.       cyanocobalamin (VITAMIN B-12) 1000 MCG tablet Take 1 tablet (1,000 mcg) by mouth daily 90 tablet 3     FLUoxetine (PROZAC) 20 MG capsule TAKE 1 CAPSULE BY MOUTH  TWICE DAILY 180 capsule 0     fluticasone " "(FLONASE) 50 MCG/ACT nasal spray Spray 1 spray into both nostrils daily (Patient taking differently: Spray 1 spray into both nostrils daily as needed) 16 g 3     gabapentin (NEURONTIN) 300 MG capsule TAKE 1 CAPSULE BY MOUTH 4  TIMES DAILY 360 capsule 3     levothyroxine (SYNTHROID/LEVOTHROID) 25 MCG tablet TAKE 1 TABLET BY MOUTH  DAILY 90 tablet 2     lisinopril (ZESTRIL) 10 MG tablet Take 1 tablet (10 mg) by mouth daily 90 tablet 3     loratadine (CLARITIN) 10 mg tablet [LORATADINE (CLARITIN) 10 MG TABLET] Take 10 mg by mouth daily.       magnesium oxide 250 mg Tab [MAGNESIUM OXIDE 250 MG TAB] Take 250 mg by mouth daily.       melatonin 1 mg Tab tablet [MELATONIN 1 MG TAB TABLET] Take 3 mg by mouth at bedtime.        multivitamin therapeutic tablet [MULTIVITAMIN THERAPEUTIC TABLET] Take 1 tablet by mouth daily.       nitroglycerin (NITROSTAT) 0.4 MG SL tablet [NITROGLYCERIN (NITROSTAT) 0.4 MG SL TABLET] Place 0.4 mg under the tongue every 5 (five) minutes as needed for chest pain.       nystatin (MYCOSTATIN) 121575 UNIT/GM external cream Apply topically 2 times daily 30 g 3     omega 3-dha-epa-fish oil (FISH OIL) 60- mg cap capsule [OMEGA 3-DHA-EPA-FISH OIL (FISH OIL) 60- MG CAP CAPSULE] Take 2,000 mg by mouth 2 (two) times a day.       polyethylene glycol (MIRALAX) 17 gram packet [POLYETHYLENE GLYCOL (MIRALAX) 17 GRAM PACKET] Take 17 g by mouth daily.       triamcinolone (KENALOG) 0.025 % external ointment Apply topically 2 times daily 80 g 0       Allergies   Allergen Reactions     Alendronate [Alendronic Acid] Unknown     pain     Codeine Hives     Hydrocodone-Acetaminophen Other (See Comments)     Highly sensitive, \"knocks her out and can't wake up\"     Other Drug Allergy (See Comments)      Risedronate Unknown     Bone pain     Rosuvastatin Muscle Pain (Myalgia)     Simvastatin Muscle Pain (Myalgia)          Lab Results    Chemistry/lipid CBC Cardiac Enzymes/BNP/TSH/INR   Recent Labs   Lab Test " 22  0506   CHOL 121   HDL 29*   LDL 58   TRIG 172*     Recent Labs   Lab Test 22  0506 21  1638   LDL 58 32     Recent Labs   Lab Test 22  0825 22  0759 22  0738 22  0506   NA  --   --   --  135*   POTASSIUM  --   --   --  4.3   CHLORIDE  --   --   --  103   CO2  --   --   --  29   GLC  --  160*   < > 132*   BUN  --   --   --  30*   CR 0.84  --   --  0.94   GFRESTIMATED 66  --   --  58*   LIZZY 10.6*  --   --  8.8    < > = values in this interval not displayed.     Recent Labs   Lab Test 22  0825 22  0506 22  0449   CR 0.84 0.94 1.20*     Recent Labs   Lab Test 10/11/22  1712 22  1616 22  1622   A1C 5.9* 8.0* 7.6*          Recent Labs   Lab Test 22  0506   WBC 6.1   HGB 11.5*   HCT 36.3   MCV 90        Recent Labs   Lab Test 22  0506 08/15/22  1920 21  1557   HGB 11.5* 13.7 13.9    Recent Labs   Lab Test 22  0416 08/15/22  2246 08/15/22  1920   TROPONINI 0.28 0.18 0.04     Recent Labs   Lab Test 08/15/22  1920 18  1259    383*     Recent Labs   Lab Test 22  0825   TSH 1.90     No results for input(s): INR in the last 10996 hours.     Medical History  Surgical History Family History Social History   Past Medical History:   Diagnosis Date     Acute diastolic congestive heart failure (H)      Acute on chronic congestive heart failure (H) 2018     Coronary artery disease      Diabetes mellitus, type II (H)      Diastolic dysfunction 7/10/2018     Frozen shoulder     bilateral     Hypertension      Hypertensive emergency      Parkinson disease (H)      Primary osteoarthritis involving multiple joints      Primary osteoarthritis of left knee      Recurrent UTI     hx septic shock     Thyroid disease      Past Surgical History:   Procedure Laterality Date     APPENDECTOMY       APPENDECTOMY       BACK SURGERY      L4-S1 laminectomy     BYPASS GRAFT ARTERY CORONARY      3 vessel       SECTION        SECTION       CV CORONARY ANGIOGRAM N/A 2022    Procedure: Coronary Angiogram;  Surgeon: Ignacio Baird MD;  Location: [facility] CATH LAB CV     HERNIORRHAPHY VENTRAL Left      HYSTERECTOMY       HYSTERECTOMY       JOINT REPLACEMENT Left     Total left knee     OTHER SURGICAL HISTORY      right ankle surgery     OTHER SURGICAL HISTORY      right forearm fracture     REPLACEMENT TOTAL KNEE Right      SHOULDER SURGERY Right     reversed shoulder surgery.     Family History   Problem Relation Age of Onset     Heart Disease Mother      Heart Disease Father         Social History     Socioeconomic History     Marital status:      Spouse name: Not on file     Number of children: Not on file     Years of education: Not on file     Highest education level: Not on file   Occupational History     Not on file   Tobacco Use     Smoking status: Never     Smokeless tobacco: Never   Vaping Use     Vaping Use: Never used   Substance and Sexual Activity     Alcohol use: No     Drug use: No     Sexual activity: Not Currently     Partners: Male     Birth control/protection: None   Other Topics Concern     Parent/sibling w/ CABG, MI or angioplasty before 65F 55M? No   Social History Narrative    6/15/2021 new to [place] from [place]. She has 6 kids.     Social Determinants of Health     Financial Resource Strain: Not on file   Food Insecurity: Not on file   Transportation Needs: Not on file   Physical Activity: Not on file   Stress: Not on file   Social Connections: Not on file   Intimate Partner Violence: Not on file   Housing Stability: Not on file                     [Feeling Poorly] : feeling poorly [Feeling Tired] : feeling tired [Incontinence] : incontinence [Arthralgias] : arthralgias [Joint Pain] : joint pain [Chills] : no chills [Fever] : no fever [Eye Pain] : no eye pain [Nosebleeds] : no nosebleeds [Red Eyes] : eyes not red [Hoarseness] : no hoarseness [Sore Throat] : no sore throat [Nasal Discharge] : no nasal discharge [Heart Rate Is Fast] : the heart rate was not fast [Heart Rate Is Slow] : the heart rate was not slow [Shortness Of Breath] : no shortness of breath [Palpitations] : no palpitations [Chest Pain] : no chest pain [Cough] : no cough [Wheezing] : no wheezing [Vomiting] : no vomiting [Abdominal Pain] : no abdominal pain [Vaginal Discharge] : no vaginal discharge [Skin Wound] : no skin wound [Abn Vaginal Bleeding] : no unexplained vaginal bleeding [Skin Lesions] : no skin lesions [Fainting] : no fainting [Dizziness] : no dizziness [Change In Personality] : no personality change [Emotional Problems] : no emotional problems

## 2023-03-27 ENCOUNTER — APPOINTMENT (OUTPATIENT)
Dept: GERIATRICS | Facility: CLINIC | Age: 88
End: 2023-03-27

## 2023-03-28 ENCOUNTER — APPOINTMENT (OUTPATIENT)
Dept: GERIATRICS | Facility: CLINIC | Age: 88
End: 2023-03-28
Payer: MEDICARE

## 2023-03-28 VITALS
HEART RATE: 74 BPM | SYSTOLIC BLOOD PRESSURE: 130 MMHG | BODY MASS INDEX: 22.29 KG/M2 | TEMPERATURE: 97.2 F | OXYGEN SATURATION: 96 % | WEIGHT: 142 LBS | DIASTOLIC BLOOD PRESSURE: 70 MMHG | RESPIRATION RATE: 20 BRPM | HEIGHT: 67 IN

## 2023-03-28 PROCEDURE — 99214 OFFICE O/P EST MOD 30 MIN: CPT

## 2023-03-28 NOTE — ASSESSMENT
[FreeTextEntry1] : wearing a wool sweater, no undershirt\par possibly this is irritating skin = recommended wearing\par just cotton against skin\par \par Aide will apply hydrocortisone when present, although this\par is not ideal as she is only there 3-4x a wk\par \par Fading Bruising d/t possible fall but as per [facility] mgr none\par reported. Pt has no memory of fall or c/o of back pain.

## 2023-03-28 NOTE — REVIEW OF SYSTEMS
[Incontinence] : incontinence [Confused] : confusion [Negative] : Psychiatric [FreeTextEntry8] : aide states bed is always wet when she arrives

## 2023-03-28 NOTE — PHYSICAL EXAM
[Alert] : alert [No Acute Distress] : in no acute distress [Normal Outer Ear/Nose] : the ears and nose were normal in appearance [Normal Appearance] : the appearance of the neck was normal [Supple] : the neck was supple [No Respiratory Distress] : no respiratory distress [No Acc Muscle Use] : no accessory muscle use [Respiration, Rhythm And Depth] : normal respiratory rhythm and effort [Auscultation Breath Sounds / Voice Sounds] : lungs were clear to auscultation bilaterally [Heart Rate And Rhythm] : heart rate was normal and rhythm regular [Abdomen Tenderness] : non-tender [Abdomen Soft] : soft [No Spinal Tenderness] : no spinal tenderness [Normal Gait] : normal gait [No Focal Deficits] : no focal deficits [Normal Affect] : the affect was normal [Normal Mood] : the mood was normal [de-identified] : shrugging shoulders at specific questions about her symptoms, does seem scattered and confused. [de-identified] : using walker her normal gait, no c/o of back pain, no crepitus or deformities of ribs or back [de-identified] : widespread scattered erythematous spots on back and flanks with scratch marks; two fading small bruises on mid back [de-identified] : no word slurring no facial droops

## 2023-03-28 NOTE — REASON FOR VISIT
[Acute] : an acute visit [Formal Caregiver] : formal caregiver [FreeTextEntry1] : more confused [FreeTextEntry3] : hudson Clemons

## 2023-03-28 NOTE — HISTORY OF PRESENT ILLNESS
[FreeTextEntry1] : pt has been more confused than usual\par aide states on 3/27 pt put coffee cups\par into freezer\par and was rubbing [employer] toothpaste on her face.\par \par also has complaints of chronic itchy rash on her back

## 2023-03-31 RX ORDER — LEVOFLOXACIN 500 MG/1
500 TABLET, FILM COATED ORAL
Qty: 5 | Refills: 0 | Status: DISCONTINUED | COMMUNITY
Start: 2023-03-31 | End: 2023-03-31

## 2023-05-04 ENCOUNTER — RX RENEWAL (OUTPATIENT)
Age: 88
End: 2023-05-04

## 2023-05-19 ENCOUNTER — APPOINTMENT (OUTPATIENT)
Dept: GERIATRICS | Facility: CLINIC | Age: 88
End: 2023-05-19

## 2023-05-31 ENCOUNTER — LABORATORY RESULT (OUTPATIENT)
Age: 88
End: 2023-05-31

## 2023-06-02 ENCOUNTER — APPOINTMENT (OUTPATIENT)
Dept: GERIATRICS | Facility: CLINIC | Age: 88
End: 2023-06-02

## 2023-06-07 ENCOUNTER — APPOINTMENT (OUTPATIENT)
Dept: GERIATRICS | Facility: CLINIC | Age: 88
End: 2023-06-07
Payer: MEDICARE

## 2023-06-07 VITALS
TEMPERATURE: 97.1 F | DIASTOLIC BLOOD PRESSURE: 70 MMHG | HEART RATE: 61 BPM | RESPIRATION RATE: 16 BRPM | OXYGEN SATURATION: 97 % | SYSTOLIC BLOOD PRESSURE: 130 MMHG

## 2023-06-07 DIAGNOSIS — I73.9 PERIPHERAL VASCULAR DISEASE, UNSPECIFIED: ICD-10-CM

## 2023-06-07 DIAGNOSIS — F01.50 VASCULAR DEMENTIA W/OUT BEHAVIORAL DISTURBANCE: ICD-10-CM

## 2023-06-07 DIAGNOSIS — R41.89 OTHER SYMPTOMS AND SIGNS INVOLVING COGNITIVE FUNCTIONS AND AWARENESS: ICD-10-CM

## 2023-06-07 DIAGNOSIS — D64.9 ANEMIA, UNSPECIFIED: ICD-10-CM

## 2023-06-07 DIAGNOSIS — R41.3 OTHER AMNESIA: ICD-10-CM

## 2023-06-07 DIAGNOSIS — I47.29 OTHER VENTRICULAR TACHYCARDIA: ICD-10-CM

## 2023-06-07 DIAGNOSIS — S32.000A WEDGE COMPRESSION FRACTURE OF UNSPECIFIED LUMBAR VERTEBRA, INITIAL ENCOUNTER FOR CLOSED FRACTURE: ICD-10-CM

## 2023-06-07 DIAGNOSIS — I10 ESSENTIAL (PRIMARY) HYPERTENSION: ICD-10-CM

## 2023-06-07 PROCEDURE — 99214 OFFICE O/P EST MOD 30 MIN: CPT

## 2023-06-07 NOTE — HISTORY OF PRESENT ILLNESS
[Any fall with injury in past year] : Patient reported fall with injury in the past year [Completely Independent] : Completely independent. [Completely Dependent] : Completely dependent. [Walker] : walker [Smoke Detector] : smoke detector [Wears Seat Belt] : wears seat belt [0] : 2) Feeling down, depressed, or hopeless: Not at all (0) [PHQ-2 Negative - No further assessment needed] : PHQ-2 Negative - No further assessment needed [Moderate] : Stage: Moderate [Stable] : Status: Stable [FreeTextEntry1] : presenting with son José\par now with mandated 24 hour HHA\par had incident - she placed an incontinence pad in microwave and was not sure why\par she is now posing a threat to herself and others\par plan is likely to move to memory unit\par turning 89 this summer \par no longer on UTI ppx due to concern about antibiotic resistance\par Daughter Ileana just had a visit from [place]\par worsening cognitive decline\par children are trying to honors moms wishes to stay in independent living\par but she is showing more and more signs of decline\par reviewed labs \par goal is to minimize medications given advancing dementia \par \par \par \par \par \par  [[id number]] : 0

## 2023-06-07 NOTE — ASSESSMENT
[FreeTextEntry1] : TSH rising\par will repeat TFT in 8 weeks\par but will assure with [facility] that thyroid medication is given first thing in AM\par lungs are clear\par goal is to avoid antibitoics to prevent resistance (antibiotic)\par turning 89 this summer \par likely will need 3122 in summer moving to [place] \par \par \par \par still on meloxicam which can be causing anemia but more important for her quality of life \par

## 2023-07-19 NOTE — REASON FOR VISIT
Attempted to contact patient regarding referral. LM for patient to return call to office     [Follow-Up] : a follow-up visit

## 2023-07-25 ENCOUNTER — NON-APPOINTMENT (OUTPATIENT)
Age: 88
End: 2023-07-25

## 2023-07-25 ENCOUNTER — APPOINTMENT (OUTPATIENT)
Dept: GERIATRICS | Facility: CLINIC | Age: 88
End: 2023-07-25
Payer: MEDICARE

## 2023-07-25 VITALS
DIASTOLIC BLOOD PRESSURE: 80 MMHG | HEART RATE: 60 BPM | SYSTOLIC BLOOD PRESSURE: 126 MMHG | TEMPERATURE: 97.7 F | RESPIRATION RATE: 18 BRPM

## 2023-07-25 DIAGNOSIS — R26.89 OTHER ABNORMALITIES OF GAIT AND MOBILITY: ICD-10-CM

## 2023-07-25 DIAGNOSIS — N39.0 URINARY TRACT INFECTION, SITE NOT SPECIFIED: ICD-10-CM
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
## 2023-07-25 DIAGNOSIS — R05.1 ACUTE COUGH: ICD-10-CM

## 2023-07-25 DIAGNOSIS — R26.81 UNSTEADINESS ON FEET: ICD-10-CM

## 2023-07-25 PROCEDURE — 99214 OFFICE O/P EST MOD 30 MIN: CPT

## 2023-07-25 RX ORDER — HYDROCORTISONE 1 G/100G
1 OINTMENT TOPICAL TWICE DAILY
Qty: 1 | Refills: 3 | Status: DISCONTINUED | COMMUNITY
Start: 2023-03-28 | End: 2023-07-25

## 2023-07-25 RX ORDER — AZELASTINE HYDROCHLORIDE 137 UG/1
0.1 SPRAY, METERED NASAL TWICE DAILY
Qty: 1 | Refills: 0 | Status: DISCONTINUED | COMMUNITY
Start: 2022-12-05 | End: 2023-07-25

## 2023-07-25 RX ORDER — ATORVASTATIN CALCIUM 80 MG/1
80 TABLET, FILM COATED ORAL
Qty: 90 | Refills: 3 | Status: DISCONTINUED | COMMUNITY
Start: 2020-03-30 | End: 2023-07-25

## 2023-07-25 RX ORDER — BACITRACIN 500 [IU]/G
500 OINTMENT TOPICAL
Qty: 28 | Refills: 0 | Status: DISCONTINUED | COMMUNITY
Start: 2022-12-02 | End: 2023-07-25

## 2023-07-25 RX ORDER — LEVOFLOXACIN 500 MG/1
500 TABLET, FILM COATED ORAL
Qty: 5 | Refills: 0 | Status: DISCONTINUED | COMMUNITY
Start: 2023-03-31 | End: 2023-07-25

## 2023-07-25 RX ORDER — UREA 10 %
140 (45 FE) LOTION (ML) TOPICAL
Qty: 90 | Refills: 3 | Status: DISCONTINUED | COMMUNITY
Start: 2022-04-20 | End: 2023-07-25

## 2023-07-25 NOTE — HISTORY OF PRESENT ILLNESS
[FreeTextEntry1] : right leg wound d/t trauma from fall while away\par with son possibly banging leg against walker. \par Does have loss of balance, uses walker\par  \par has been using bacitracin and not healing well

## 2023-07-25 NOTE — REASON FOR VISIT
[Formal Caregiver] : formal caregiver [FreeTextEntry1] : right leg wound [FreeTextEntry3] : lynsey, live in aide and MISTY Gloria

## 2023-07-25 NOTE — PHYSICAL EXAM
[Heart Rate And Rhythm] : heart rate was normal and rhythm regular [de-identified] : left leg anterior lower shin with 1.5x2.5x0.1 ragged edge wound with partially intact flap, scant serousang drainage, no pain, no erythema in periwound, 25% slough 75% granulation

## 2023-07-25 NOTE — ASSESSMENT
[FreeTextEntry1] : discussed with dtr Ileana on 7/24 prior to seeing pt.\par new order prosper to:\par cleanse, apply medihoney cover with island dressing daily\par pt aware and can guide aide to assist with same.\par \par wound dressing done by writer in office\par \par coordinated care with aide and [place] agency\par will order PT to reduce risk for falls; discussed w pt she is in agreeement\par \par

## 2023-07-28 ENCOUNTER — APPOINTMENT (OUTPATIENT)
Dept: GERIATRICS | Facility: CLINIC | Age: 88
End: 2023-07-28

## 2023-07-31 ENCOUNTER — APPOINTMENT (OUTPATIENT)
Dept: GERIATRICS | Facility: CLINIC | Age: 88
End: 2023-07-31
Payer: MEDICARE

## 2023-07-31 VITALS
SYSTOLIC BLOOD PRESSURE: 100 MMHG | HEART RATE: 64 BPM | TEMPERATURE: 98.1 F | DIASTOLIC BLOOD PRESSURE: 60 MMHG | RESPIRATION RATE: 20 BRPM

## 2023-07-31 PROCEDURE — 99213 OFFICE O/P EST LOW 20 MIN: CPT

## 2023-08-02 NOTE — PHYSICAL EXAM
[Alert] : alert [Normal Outer Ear/Nose] : the ears and nose were normal in appearance [Normal Appearance] : the appearance of the neck was normal [Supple] : the neck was supple [No Respiratory Distress] : no respiratory distress [Normal S1, S2] : normal S1 and S2 [Heart Rate And Rhythm] : heart rate was normal and rhythm regular [Normal Color / Pigmentation] : normal skin color and pigmentation [Normal Turgor] : normal skin turgor [No Focal Deficits] : no focal deficits [Normal Affect] : the affect was normal [Normal Mood] : the mood was normal [de-identified] : left leg anterior lower shin with 1x3x0.1, 40% slough, 60% granulation, scant serousang drainage, no pain, no erythema in periwound,

## 2023-08-03 ENCOUNTER — RESULT REVIEW (OUTPATIENT)
Age: 88
End: 2023-08-03

## 2023-08-03 ENCOUNTER — APPOINTMENT (OUTPATIENT)
Dept: GERIATRICS | Facility: CLINIC | Age: 88
End: 2023-08-03
Payer: MEDICARE

## 2023-08-03 VITALS
DIASTOLIC BLOOD PRESSURE: 60 MMHG | RESPIRATION RATE: 18 BRPM | TEMPERATURE: 98.1 F | SYSTOLIC BLOOD PRESSURE: 120 MMHG | HEART RATE: 62 BPM | OXYGEN SATURATION: 98 %

## 2023-08-03 DIAGNOSIS — R41.0 DISORIENTATION, UNSPECIFIED: ICD-10-CM

## 2023-08-03 DIAGNOSIS — R30.0 DYSURIA: ICD-10-CM

## 2023-08-03 DIAGNOSIS — S91.109A UNSPECIFIED OPEN WOUND OF UNSPECIFIED TOE(S) W/OUT DAMAGE TO NAIL, INITIAL ENCOUNTER: ICD-10-CM

## 2023-08-03 DIAGNOSIS — R60.9 EDEMA, UNSPECIFIED: ICD-10-CM

## 2023-08-03 DIAGNOSIS — Z87.2 PERSONAL HISTORY OF DISEASES OF THE SKIN AND SUBCUTANEOUS TISSUE: ICD-10-CM

## 2023-08-03 DIAGNOSIS — S81.802A UNSPECIFIED OPEN WOUND, LEFT LOWER LEG, INITIAL ENCOUNTER: ICD-10-CM

## 2023-08-03 DIAGNOSIS — L84 CORNS AND CALLOSITIES: ICD-10-CM

## 2023-08-03 PROCEDURE — 99213 OFFICE O/P EST LOW 20 MIN: CPT

## 2023-08-03 NOTE — PHYSICAL EXAM
[Normal Outer Ear/Nose] : the ears and nose were normal in appearance [Normal Appearance] : the appearance of the neck was normal [Supple] : the neck was supple [No Respiratory Distress] : no respiratory distress [Respiration, Rhythm And Depth] : normal respiratory rhythm and effort [Normal S1, S2] : normal S1 and S2 [Heart Rate And Rhythm] : heart rate was normal and rhythm regular [___ +] : bilateral [unfilled]+ pretibial pitting edema [___+] : [unfilled]+ pretibial pitting edema on the left [Normal Color / Pigmentation] : normal skin color and pigmentation [Normal Turgor] : normal skin turgor [No Focal Deficits] : no focal deficits [Normal Affect] : the affect was normal [Normal Mood] : the mood was normal [de-identified] : appearing confused [de-identified] : left leg anterior lower shin wound improving. right foot and lower leg erythema and edema, no pain on palpation

## 2023-08-03 NOTE — HISTORY OF PRESENT ILLNESS
[FreeTextEntry1] : aide here states that pt has swelling of her right leg no falls, no fever, no c/o pain in legs

## 2023-08-03 NOTE — ASSESSMENT
[FreeTextEntry1] : Pt with history of frequent UTI which can cause her confusion also with history of leg edema Here due to edema of right leg, wound left leg  U/A, C&S tubigrip for edema and elevate legs

## 2023-08-04 ENCOUNTER — TRANSCRIPTION ENCOUNTER (OUTPATIENT)
Age: 88
End: 2023-08-04

## 2023-08-07 ENCOUNTER — TRANSCRIPTION ENCOUNTER (OUTPATIENT)
Age: 88
End: 2023-08-07

## 2023-08-14 ENCOUNTER — TRANSCRIPTION ENCOUNTER (OUTPATIENT)
Age: 88
End: 2023-08-14

## 2023-08-16 ENCOUNTER — LABORATORY RESULT (OUTPATIENT)
Age: 88
End: 2023-08-16

## 2023-08-23 ENCOUNTER — APPOINTMENT (OUTPATIENT)
Dept: GERIATRICS | Facility: CLINIC | Age: 88
End: 2023-08-23

## 2023-08-28 ENCOUNTER — TRANSCRIPTION ENCOUNTER (OUTPATIENT)
Age: 88
End: 2023-08-28

## 2023-10-19 RX ORDER — MIRABEGRON 50 MG/1
50 TABLET, FILM COATED, EXTENDED RELEASE ORAL
Qty: 90 | Refills: 3 | Status: ACTIVE | COMMUNITY
Start: 2019-09-10 | End: 1900-01-01

## 2023-10-19 RX ORDER — AMLODIPINE BESYLATE 5 MG/1
5 TABLET ORAL
Qty: 90 | Refills: 3 | Status: ACTIVE | COMMUNITY
Start: 2019-09-10 | End: 1900-01-01

## 2023-10-19 RX ORDER — ACETAMINOPHEN 500 MG/1
500 TABLET ORAL TWICE DAILY
Qty: 120 | Refills: 11 | Status: ACTIVE | COMMUNITY
Start: 2019-02-15 | End: 1900-01-01

## 2023-10-19 RX ORDER — OMEGA-3/DHA/EPA/FISH OIL 300-1000MG
45 CAPSULE ORAL DAILY
Qty: 90 | Refills: 3 | Status: ACTIVE | COMMUNITY
Start: 2022-04-20 | End: 1900-01-01

## 2023-10-19 RX ORDER — INCONTINENCE PAD,LINER,DISP
EACH MISCELLANEOUS
Qty: 33 | Refills: 0 | Status: COMPLETED | COMMUNITY
Start: 2022-11-16 | End: 2023-10-19

## 2023-10-19 RX ORDER — CYANOCOBALAMIN (VITAMIN B-12) 2000 MCG
2000 TABLET, EXTENDED RELEASE ORAL DAILY
Qty: 90 | Refills: 3 | Status: ACTIVE | COMMUNITY
Start: 2020-01-14 | End: 1900-01-01

## 2023-10-19 RX ORDER — MELOXICAM 7.5 MG/1
7.5 TABLET ORAL DAILY
Qty: 90 | Refills: 3 | Status: ACTIVE | COMMUNITY
Start: 2020-01-14 | End: 1900-01-01

## 2023-10-19 RX ORDER — FUROSEMIDE 20 MG/1
20 TABLET ORAL
Qty: 90 | Refills: 3 | Status: ACTIVE | COMMUNITY
Start: 2020-03-31 | End: 1900-01-01

## 2023-10-19 RX ORDER — LEVOTHYROXINE SODIUM 0.1 MG/1
100 TABLET ORAL
Qty: 90 | Refills: 3 | Status: ACTIVE | COMMUNITY
Start: 2022-09-21 | End: 1900-01-01

## 2023-10-19 RX ORDER — OXYBUTYNIN CHLORIDE 10 MG/1
10 TABLET, EXTENDED RELEASE ORAL
Qty: 90 | Refills: 3 | Status: ACTIVE | COMMUNITY
Start: 2020-09-16 | End: 1900-01-01

## 2023-10-19 RX ORDER — MULTIVIT-MIN/FOLIC/VIT K/LYCOP 400-300MCG
50 MCG TABLET ORAL
Qty: 90 | Refills: 3 | Status: ACTIVE | COMMUNITY
Start: 2020-09-18 | End: 1900-01-01

## 2023-10-19 RX ORDER — METOPROLOL SUCCINATE 25 MG/1
25 TABLET, EXTENDED RELEASE ORAL
Qty: 90 | Refills: 3 | Status: ACTIVE | COMMUNITY
Start: 2019-08-12 | End: 1900-01-01

## 2024-01-10 ENCOUNTER — TRANSCRIPTION ENCOUNTER (OUTPATIENT)
Age: 89
End: 2024-01-10

## 2024-10-20 NOTE — ASSESSMENT
[FreeTextEntry1] : EKG - \par 2/20/18 NSR\par 12/4/18 - NSR, LAE, low voltage\par \par 1. Palpitations - improved on toprol\par 2. Essential (primary) hypertension - I10  controlled\par 3. NSVT (nonsustained ventricular tachycardia) - I47.2  - stress test in Nov 2017 - no ischemia. \par 4. Claudication - I73.9   improved\par  continue metoprolol, hydrochlorothiazide, amlodipine. \par Holter monitor in November 2017 reveals 13 beats of V. tach patient remains on Toprol no current issues\par Blood pressure is well controlled. 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 \par Follow-up with Dr. Sanchez. She is unclear if she has done this. \par Pt does not know her meds - therefore unable to reconcile.\par Recommend neurology follow up for memory issues.  24.4

## 2025-02-04 NOTE — ASSESSMENT
[FreeTextEntry1] : I personally reviewed the relevant imaging.  Discussed and explained to patient the likely source of pathology and pain.  Questions answered.\par \par SIgnificant pain maladaptive, after fall with + SIJ provocative tests and refractory to conservative treatments. s/p SIJ steroid injection with mild improvement\par \par continue Tylenol\par \par L1 subacute compression fx on MRI, now source of lower back pain, tender to palpation and refractory to conservative treatments.  Will schedule L1 and L2 vertebral augmentation (balloon kyphoplasty) and BM biopsy r/b/a discussed\par \par Patient will speak with PMD regarding holding asa for 1 week prior to procedure\par \par Medical clearance with PMD\par \par referral to Dr. Wilson for endocrinology evaluation treatment of osteoporosis\par \par The above diagnosis and treatment plan is medically reasonable and necessary based on the patient encounter.\par \par There were no barriers to communication.\par Informed patient that I would be available for any additional questions.\par Patient was instructed to call with any worsening symptoms including severe pain, new numbness/weakness, or changes in the bowel/bladder function. \par \par Discussed role of nsaids in pain management and all relevant risks, if patient is continuing to require after 4 weeks the patient should f/u for alternative treatment. \par \par Instructed patient to maintain pain diary to monitor pain level, mobility, and function.\par \par The referring provider was informed of the above diagnosis and treatment plan.\par  N/A